# Patient Record
Sex: FEMALE | Race: WHITE | NOT HISPANIC OR LATINO | Employment: FULL TIME | RURAL
[De-identification: names, ages, dates, MRNs, and addresses within clinical notes are randomized per-mention and may not be internally consistent; named-entity substitution may affect disease eponyms.]

---

## 2021-10-29 ENCOUNTER — OFFICE VISIT (OUTPATIENT)
Dept: FAMILY MEDICINE | Facility: CLINIC | Age: 61
End: 2021-10-29
Payer: COMMERCIAL

## 2021-10-29 VITALS
SYSTOLIC BLOOD PRESSURE: 166 MMHG | HEIGHT: 61 IN | BODY MASS INDEX: 35.12 KG/M2 | HEART RATE: 82 BPM | DIASTOLIC BLOOD PRESSURE: 87 MMHG | WEIGHT: 186 LBS

## 2021-10-29 DIAGNOSIS — R21 RASH AND OTHER NONSPECIFIC SKIN ERUPTION: Primary | ICD-10-CM

## 2021-10-29 PROCEDURE — 3008F BODY MASS INDEX DOCD: CPT | Mod: ,,, | Performed by: NURSE PRACTITIONER

## 2021-10-29 PROCEDURE — 3008F PR BODY MASS INDEX (BMI) DOCUMENTED: ICD-10-PCS | Mod: ,,, | Performed by: NURSE PRACTITIONER

## 2021-10-29 PROCEDURE — 3079F PR MOST RECENT DIASTOLIC BLOOD PRESSURE 80-89 MM HG: ICD-10-PCS | Mod: ,,, | Performed by: NURSE PRACTITIONER

## 2021-10-29 PROCEDURE — 1160F PR REVIEW ALL MEDS BY PRESCRIBER/CLIN PHARMACIST DOCUMENTED: ICD-10-PCS | Mod: ,,, | Performed by: NURSE PRACTITIONER

## 2021-10-29 PROCEDURE — 3077F PR MOST RECENT SYSTOLIC BLOOD PRESSURE >= 140 MM HG: ICD-10-PCS | Mod: ,,, | Performed by: NURSE PRACTITIONER

## 2021-10-29 PROCEDURE — 99213 OFFICE O/P EST LOW 20 MIN: CPT | Mod: ,,, | Performed by: NURSE PRACTITIONER

## 2021-10-29 PROCEDURE — 1159F PR MEDICATION LIST DOCUMENTED IN MEDICAL RECORD: ICD-10-PCS | Mod: ,,, | Performed by: NURSE PRACTITIONER

## 2021-10-29 PROCEDURE — 1160F RVW MEDS BY RX/DR IN RCRD: CPT | Mod: ,,, | Performed by: NURSE PRACTITIONER

## 2021-10-29 PROCEDURE — 3077F SYST BP >= 140 MM HG: CPT | Mod: ,,, | Performed by: NURSE PRACTITIONER

## 2021-10-29 PROCEDURE — 1159F MED LIST DOCD IN RCRD: CPT | Mod: ,,, | Performed by: NURSE PRACTITIONER

## 2021-10-29 PROCEDURE — 3079F DIAST BP 80-89 MM HG: CPT | Mod: ,,, | Performed by: NURSE PRACTITIONER

## 2021-10-29 PROCEDURE — 99213 PR OFFICE/OUTPT VISIT, EST, LEVL III, 20-29 MIN: ICD-10-PCS | Mod: ,,, | Performed by: NURSE PRACTITIONER

## 2022-07-08 ENCOUNTER — OFFICE VISIT (OUTPATIENT)
Dept: FAMILY MEDICINE | Facility: CLINIC | Age: 62
End: 2022-07-08
Payer: COMMERCIAL

## 2022-07-08 VITALS — BODY MASS INDEX: 31.89 KG/M2 | WEIGHT: 180 LBS | HEIGHT: 63 IN

## 2022-07-08 DIAGNOSIS — J02.9 SORE THROAT: ICD-10-CM

## 2022-07-08 DIAGNOSIS — U07.1 COVID-19: Primary | ICD-10-CM

## 2022-07-08 DIAGNOSIS — R05.9 COUGH: ICD-10-CM

## 2022-07-08 DIAGNOSIS — R50.9 FEVER, UNSPECIFIED FEVER CAUSE: ICD-10-CM

## 2022-07-08 LAB
CTP QC/QA: YES
SARS-COV-2 AG RESP QL IA.RAPID: POSITIVE

## 2022-07-08 PROCEDURE — 1159F MED LIST DOCD IN RCRD: CPT | Mod: ,,, | Performed by: NURSE PRACTITIONER

## 2022-07-08 PROCEDURE — 87426 SARS CORONAVIRUS 2 ANTIGEN POCT: ICD-10-PCS | Mod: QW,,, | Performed by: NURSE PRACTITIONER

## 2022-07-08 PROCEDURE — 87426 SARSCOV CORONAVIRUS AG IA: CPT | Mod: QW,,, | Performed by: NURSE PRACTITIONER

## 2022-07-08 PROCEDURE — 1160F RVW MEDS BY RX/DR IN RCRD: CPT | Mod: ,,, | Performed by: NURSE PRACTITIONER

## 2022-07-08 PROCEDURE — 3008F BODY MASS INDEX DOCD: CPT | Mod: ,,, | Performed by: NURSE PRACTITIONER

## 2022-07-08 PROCEDURE — 3008F PR BODY MASS INDEX (BMI) DOCUMENTED: ICD-10-PCS | Mod: ,,, | Performed by: NURSE PRACTITIONER

## 2022-07-08 PROCEDURE — 99213 PR OFFICE/OUTPT VISIT, EST, LEVL III, 20-29 MIN: ICD-10-PCS | Mod: ,,, | Performed by: NURSE PRACTITIONER

## 2022-07-08 PROCEDURE — 99213 OFFICE O/P EST LOW 20 MIN: CPT | Mod: ,,, | Performed by: NURSE PRACTITIONER

## 2022-07-08 PROCEDURE — 1159F PR MEDICATION LIST DOCUMENTED IN MEDICAL RECORD: ICD-10-PCS | Mod: ,,, | Performed by: NURSE PRACTITIONER

## 2022-07-08 PROCEDURE — 1160F PR REVIEW ALL MEDS BY PRESCRIBER/CLIN PHARMACIST DOCUMENTED: ICD-10-PCS | Mod: ,,, | Performed by: NURSE PRACTITIONER

## 2022-07-08 RX ORDER — METHYLPREDNISOLONE 4 MG/1
TABLET ORAL
Qty: 21 EACH | Refills: 0 | Status: SHIPPED | OUTPATIENT
Start: 2022-07-08 | End: 2022-07-29

## 2022-07-08 RX ORDER — AZITHROMYCIN 500 MG/1
500 TABLET, FILM COATED ORAL DAILY
Qty: 7 TABLET | Refills: 0 | Status: SHIPPED | OUTPATIENT
Start: 2022-07-08 | End: 2023-03-28

## 2022-07-08 NOTE — LETTER
July 8, 2022    Tavia Jordan  [address] [place] [address]  [place] [address]             [facility]  Family Medicine  [address] [facility]  [place] [address]  Phone: [phone]  Fax: [phone]   July 8, 2022     Patient: Tavia Jordan   YOB: 1960   Date of Visit: 7/8/2022       To Whom it May Concern:    Tavia Jordan was seen in my clinic on 7/8/2022. She may return to work on 07/13/2022.    Please excuse her from any classes or work missed.    If you have any questions or concerns, please don't hesitate to call.    Sincerely,         EDUARD LeivaP

## 2022-07-08 NOTE — PROGRESS NOTES
Subjective:       Patient ID: Tavia Jordan is a 62 y.o. female.    Chief Complaint: Cough, Fever, Sore Throat, Sinus Problem, and Other (Pt c/o symptoms x 1 day, pt states she took a covid test at home yesterday and it was positive.)    HPI   Patient presents for COVID-19 testing    Says that she took home COVID-19 test yesterday and result was positive    Symptoms began 1 day ago    Review of Systems   Constitutional: Positive for fatigue and fever.   HENT: Positive for nasal congestion and sore throat.    Respiratory: Positive for cough. Negative for shortness of breath and wheezing.    Cardiovascular: Negative for chest pain.   Gastrointestinal: Negative for abdominal pain.   Neurological: Positive for headaches.         Objective:      Physical Exam  Vitals and nursing note reviewed.   Constitutional:       General: She is not in acute distress.     Appearance: Normal appearance. She is ill-appearing.   HENT:      Head: Normocephalic.      Nose: Congestion present.      Mouth/Throat:      Mouth: Mucous membranes are moist.   Eyes:      Conjunctiva/sclera: Conjunctivae normal.   Neck:      Thyroid: No thyromegaly.      Trachea: Trachea normal.   Cardiovascular:      Rate and Rhythm: Normal rate and regular rhythm.      Pulses: Normal pulses.      Heart sounds: Normal heart sounds.   Pulmonary:      Effort: Pulmonary effort is normal. No respiratory distress.      Breath sounds: Normal breath sounds.   Musculoskeletal:      Cervical back: Neck supple.   Lymphadenopathy:      Cervical: No cervical adenopathy.   Skin:     General: Skin is warm and dry.   Neurological:      General: No focal deficit present.      Mental Status: She is alert and oriented to person, place, and time.   Psychiatric:         Mood and Affect: Mood normal.         Behavior: Behavior normal.         Assessment:       1. COVID-19    2. Fever, unspecified fever cause    3. Cough    4. Sore throat        Plan:       Patient  Instructions   COVID-19 POSITIVE    Instructed to quarantine x 5 days then may return to work at which point she needs to wear a mask for another 5 days    Medications as prescribed along with supportive care measures    Follow up if any worsening s/s develop    Patient voiced understanding of all    COVID-19  -     azithromycin (ZITHROMAX) 500 MG tablet; Take 1 tablet (500 mg total) by mouth once daily.  Dispense: 7 tablet; Refill: 0  -     methylPREDNISolone (MEDROL DOSEPACK) 4 mg tablet; use as directed  Dispense: 21 each; Refill: 0    Fever, unspecified fever cause  -     SARS Coronavirus 2 Antigen, POCT    Cough  -     SARS Coronavirus 2 Antigen, POCT    Sore throat  -     SARS Coronavirus 2 Antigen, POCT       I, Dr. Red Márquez M.D., have reviewed the encounter note for this visit and agree with the assessment and plan as put forth by the Nurse Practitioner.

## 2022-07-08 NOTE — PATIENT INSTRUCTIONS
COVID-19 POSITIVE    Instructed to quarantine x 5 days then may return to work at which point she needs to wear a mask for another 5 days    Medications as prescribed along with supportive care measures    Follow up if any worsening s/s develop    Patient voiced understanding of all

## 2022-07-11 ENCOUNTER — TELEPHONE (OUTPATIENT)
Dept: FAMILY MEDICINE | Facility: CLINIC | Age: 62
End: 2022-07-11
Payer: COMMERCIAL

## 2022-07-11 DIAGNOSIS — R05.9 COUGH: Primary | ICD-10-CM

## 2022-07-11 RX ORDER — BENZONATATE 100 MG/1
100 CAPSULE ORAL 3 TIMES DAILY PRN
Qty: 30 CAPSULE | Refills: 0 | Status: SHIPPED | OUTPATIENT
Start: 2022-07-11 | End: 2022-07-21

## 2022-07-11 NOTE — TELEPHONE ENCOUNTER
"Patient called to say that she is much better overall since Friday but is having intermittent "coughing spells" and is now hoarse. Wants to know if we can add anything to help.     Will send in Tessalon Perles for PRN use.   Follow up if symptoms persist/worsen  "

## 2023-03-28 ENCOUNTER — OFFICE VISIT (OUTPATIENT)
Dept: FAMILY MEDICINE | Facility: CLINIC | Age: 63
End: 2023-03-28
Payer: COMMERCIAL

## 2023-03-28 VITALS
TEMPERATURE: 98 F | HEART RATE: 82 BPM | BODY MASS INDEX: 33.31 KG/M2 | WEIGHT: 188 LBS | HEIGHT: 63 IN | DIASTOLIC BLOOD PRESSURE: 85 MMHG | OXYGEN SATURATION: 97 % | SYSTOLIC BLOOD PRESSURE: 169 MMHG

## 2023-03-28 DIAGNOSIS — R03.0 ELEVATED BLOOD PRESSURE READING WITHOUT DIAGNOSIS OF HYPERTENSION: ICD-10-CM

## 2023-03-28 DIAGNOSIS — L30.9 DERMATITIS: Primary | ICD-10-CM

## 2023-03-28 PROCEDURE — 1159F PR MEDICATION LIST DOCUMENTED IN MEDICAL RECORD: ICD-10-PCS | Mod: ,,, | Performed by: NURSE PRACTITIONER

## 2023-03-28 PROCEDURE — 1160F PR REVIEW ALL MEDS BY PRESCRIBER/CLIN PHARMACIST DOCUMENTED: ICD-10-PCS | Mod: ,,, | Performed by: NURSE PRACTITIONER

## 2023-03-28 PROCEDURE — 1101F PT FALLS ASSESS-DOCD LE1/YR: CPT | Mod: ,,, | Performed by: NURSE PRACTITIONER

## 2023-03-28 PROCEDURE — 99213 OFFICE O/P EST LOW 20 MIN: CPT | Mod: ,,, | Performed by: NURSE PRACTITIONER

## 2023-03-28 PROCEDURE — 3288F PR FALLS RISK ASSESSMENT DOCUMENTED: ICD-10-PCS | Mod: ,,, | Performed by: NURSE PRACTITIONER

## 2023-03-28 PROCEDURE — 3077F SYST BP >= 140 MM HG: CPT | Mod: ,,, | Performed by: NURSE PRACTITIONER

## 2023-03-28 PROCEDURE — 3288F FALL RISK ASSESSMENT DOCD: CPT | Mod: ,,, | Performed by: NURSE PRACTITIONER

## 2023-03-28 PROCEDURE — 99213 PR OFFICE/OUTPT VISIT, EST, LEVL III, 20-29 MIN: ICD-10-PCS | Mod: ,,, | Performed by: NURSE PRACTITIONER

## 2023-03-28 PROCEDURE — 3079F PR MOST RECENT DIASTOLIC BLOOD PRESSURE 80-89 MM HG: ICD-10-PCS | Mod: ,,, | Performed by: NURSE PRACTITIONER

## 2023-03-28 PROCEDURE — 1101F PR PT FALLS ASSESS DOC 0-1 FALLS W/OUT INJ PAST YR: ICD-10-PCS | Mod: ,,, | Performed by: NURSE PRACTITIONER

## 2023-03-28 PROCEDURE — 1159F MED LIST DOCD IN RCRD: CPT | Mod: ,,, | Performed by: NURSE PRACTITIONER

## 2023-03-28 PROCEDURE — 3079F DIAST BP 80-89 MM HG: CPT | Mod: ,,, | Performed by: NURSE PRACTITIONER

## 2023-03-28 PROCEDURE — 3008F BODY MASS INDEX DOCD: CPT | Mod: ,,, | Performed by: NURSE PRACTITIONER

## 2023-03-28 PROCEDURE — 1160F RVW MEDS BY RX/DR IN RCRD: CPT | Mod: ,,, | Performed by: NURSE PRACTITIONER

## 2023-03-28 PROCEDURE — 3077F PR MOST RECENT SYSTOLIC BLOOD PRESSURE >= 140 MM HG: ICD-10-PCS | Mod: ,,, | Performed by: NURSE PRACTITIONER

## 2023-03-28 PROCEDURE — 3008F PR BODY MASS INDEX (BMI) DOCUMENTED: ICD-10-PCS | Mod: ,,, | Performed by: NURSE PRACTITIONER

## 2023-03-28 RX ORDER — HYDROXYZINE HYDROCHLORIDE 10 MG/1
10 TABLET, FILM COATED ORAL EVERY 8 HOURS PRN
Qty: 30 TABLET | Refills: 0 | Status: SHIPPED | OUTPATIENT
Start: 2023-03-28 | End: 2023-05-18

## 2023-03-28 RX ORDER — TRIAMCINOLONE ACETONIDE 1 MG/G
OINTMENT TOPICAL 2 TIMES DAILY
Qty: 30 G | Refills: 1 | Status: SHIPPED | OUTPATIENT
Start: 2023-03-28 | End: 2023-06-08 | Stop reason: SDUPTHER

## 2023-03-28 RX ORDER — METHYLPREDNISOLONE 4 MG/1
TABLET ORAL
Qty: 21 EACH | Refills: 0 | Status: SHIPPED | OUTPATIENT
Start: 2023-03-28 | End: 2023-04-18

## 2023-03-28 NOTE — PROGRESS NOTES
Clinic note     Patient name: Tavia Jordan is a 63 y.o. female   Chief compliant   Chief Complaint   Patient presents with    Rash     Rash on arms has used OTC cortisone and calamine lotion. Now has it on both shins and lower back.  Changed laundry soap but has changed back. Nothing else new.        Subjective     History of present illness   PCP: none   In clinic for evaluation of pruritic skin rash x three weeks, rash started on BUE after wearing a sweater washed in a new laundry detergent, since then rash has seemed to spread to lower bilateral legs and now to lower back   Denies any fever   Denies any known contact with any other new foods, substances  Denies any use of OTC medication other than benadryl for itching,  no use of OTC herbal or vitamin products   Denies any recent travel or hotel room stays   She has been using cortisone cream and calamine lotion with no improvement   Blood pressure is elevated in clinic today, she is currently not on any medication for HTN   She states she has taken something in distant past written by Dr Mccoy   She denies any chest pain, shortness of breath, dizziness, palpitations or headache           Social History     Tobacco Use    Smoking status: Never     Passive exposure: Never    Smokeless tobacco: Never   Substance Use Topics    Alcohol use: Never    Drug use: Never       Review of patient's allergies indicates:  No Known Allergies    History reviewed. No pertinent past medical history.    Past Surgical History:   Procedure Laterality Date    HYSTERECTOMY  1980        History reviewed. No pertinent family history.      Current Outpatient Medications:     vitamins A,C,E-zinc-copper 4,296 mcg-226 mg-90 mg Cap, Take by mouth., Disp: , Rfl:     hydrOXYzine HCL (ATARAX) 10 MG Tab, Take 1 tablet (10 mg total) by mouth every 8 (eight) hours as needed (itching)., Disp: 30 tablet, Rfl: 0    methylPREDNISolone (MEDROL DOSEPACK) 4 mg tablet, use as directed, Disp: 21  "each, Rfl: 0    triamcinolone acetonide 0.1% (KENALOG) 0.1 % ointment, Apply topically 2 (two) times daily. Apply to affected areas as needed, Disp: 30 g, Rfl: 1    Review of Systems   Constitutional:  Negative for appetite change, chills, fatigue, fever and unexpected weight change.   Respiratory:  Negative for cough and shortness of breath.    Cardiovascular:  Negative for chest pain, palpitations and leg swelling.   Gastrointestinal:  Negative for abdominal pain, change in bowel habit, constipation, diarrhea, nausea, vomiting and change in bowel habit.   Genitourinary:  Negative for dysuria and frequency.   Musculoskeletal:  Negative for arthralgias, gait problem and myalgias.   Integumentary:  Positive for rash.   Neurological:  Negative for dizziness, syncope, light-headedness and headaches.   Psychiatric/Behavioral:  Negative for dysphoric mood and sleep disturbance. The patient is not nervous/anxious.      Objective     BP (!) 169/85   Pulse 82   Temp 97.8 °F (36.6 °C)   Ht 5' 3" (1.6 m)   Wt 85.3 kg (188 lb)   SpO2 97%   BMI 33.30 kg/m²     Physical Exam   Constitutional: She is oriented to person, place, and time. No distress.   HENT:   Head: Atraumatic.   Mouth/Throat: Mucous membranes are moist.   Cardiovascular: Normal rate and regular rhythm. Pulmonary:      Effort: Pulmonary effort is normal. No respiratory distress.      Breath sounds: Normal breath sounds. No wheezing, rhonchi or rales.     Abdominal: Soft. Bowel sounds are normal. She exhibits no distension. There is no abdominal tenderness.   Musculoskeletal:         General: Normal range of motion.      Cervical back: Neck supple.      Right lower leg: No edema.      Left lower leg: No edema.   Neurological: She is alert and oriented to person, place, and time. Gait normal.   Skin: Skin is warm and dry. Rash noted. Rash is macular and papular.        Psychiatric: Her behavior is normal. Mood normal.     No results found for: WBC, HGB, HCT, " MCV, PLT    CMP  No results found for: NA, K, CL, CO2, GLU, BUN, CREATININE, CALCIUM, PROT, ALBUMIN, BILITOT, ALKPHOS, AST, ALT, ANIONGAP, ESTGFRAFRICA, EGFRNONAA  No results found for: TSH  No results found for: CHOL  No results found for: HDL  No results found for: LDLCALC  No results found for: TRIG  No results found for: CHOLHDL  No results found for: LABA1C, HGBA1C      Assessment and Plan   Dermatitis  -     triamcinolone acetonide 0.1% (KENALOG) 0.1 % ointment; Apply topically 2 (two) times daily. Apply to affected areas as needed  Dispense: 30 g; Refill: 1  -     hydrOXYzine HCL (ATARAX) 10 MG Tab; Take 1 tablet (10 mg total) by mouth every 8 (eight) hours as needed (itching).  Dispense: 30 tablet; Refill: 0  -     methylPREDNISolone (MEDROL DOSEPACK) 4 mg tablet; use as directed  Dispense: 21 each; Refill: 0    Elevated blood pressure reading without diagnosis of hypertension    BMI 33.0-33.9,adult          Patient Instructions  Patient Instructions   Dye free perfume free bath and laundry soap   Warm bath/showers, avoid use of hot water     Monitor blood pressure closely, check  in the am and again in the afternoon, keep written log if readings are consistently higher than 140/90 scheduled follow up appointment for evaluation     Decreased salt and caffeine will help lower blood pressure     Check blood pressure in the am and again in the afternoon, keep written log      Medrol, triamcinolone ointment and atarax as prescribed   Follow up in 5-7 days if no improvement in symptoms

## 2023-03-28 NOTE — PATIENT INSTRUCTIONS
Dye free perfume free bath and laundry soap   Warm bath/showers, avoid use of hot water     Monitor blood pressure closely, check  in the am and again in the afternoon, keep written log if readings are consistently higher than 140/90 scheduled follow up appointment for evaluation     Decreased salt and caffeine will help lower blood pressure     Check blood pressure in the am and again in the afternoon, keep written log      Medrol, triamcinolone ointment and atarax as prescribed   Follow up in 5-7 days if no improvement in symptoms

## 2023-05-18 ENCOUNTER — OFFICE VISIT (OUTPATIENT)
Dept: FAMILY MEDICINE | Facility: CLINIC | Age: 63
End: 2023-05-18
Payer: COMMERCIAL

## 2023-05-18 VITALS
DIASTOLIC BLOOD PRESSURE: 90 MMHG | SYSTOLIC BLOOD PRESSURE: 157 MMHG | WEIGHT: 190 LBS | BODY MASS INDEX: 33.66 KG/M2 | HEIGHT: 63 IN

## 2023-05-18 DIAGNOSIS — R21 SKIN RASH: ICD-10-CM

## 2023-05-18 DIAGNOSIS — I10 ESSENTIAL HYPERTENSION: Primary | ICD-10-CM

## 2023-05-18 DIAGNOSIS — Z13.220 SCREENING FOR HYPERLIPIDEMIA: ICD-10-CM

## 2023-05-18 DIAGNOSIS — Z13.1 SCREENING FOR DIABETES MELLITUS: ICD-10-CM

## 2023-05-18 DIAGNOSIS — Z11.59 ENCOUNTER FOR HEPATITIS C SCREENING TEST FOR LOW RISK PATIENT: ICD-10-CM

## 2023-05-18 DIAGNOSIS — Z13.29 SCREENING FOR HYPOTHYROIDISM: ICD-10-CM

## 2023-05-18 LAB
BASOPHILS # BLD AUTO: 0.1 K/UL (ref 0–0.2)
BASOPHILS NFR BLD AUTO: 1 % (ref 0–1)
DIFFERENTIAL METHOD BLD: ABNORMAL
EOSINOPHIL # BLD AUTO: 0.46 K/UL (ref 0–0.5)
EOSINOPHIL NFR BLD AUTO: 4.6 % (ref 1–4)
ERYTHROCYTE [DISTWIDTH] IN BLOOD BY AUTOMATED COUNT: 13.1 % (ref 11.5–14.5)
EST. AVERAGE GLUCOSE BLD GHB EST-MCNC: 104 MG/DL
HBA1C MFR BLD HPLC: 5.7 % (ref 4.5–6.6)
HCT VFR BLD AUTO: 40.8 % (ref 38–47)
HGB BLD-MCNC: 12.9 G/DL (ref 12–16)
IMM GRANULOCYTES # BLD AUTO: 0.04 K/UL (ref 0–0.04)
IMM GRANULOCYTES NFR BLD: 0.4 % (ref 0–0.4)
LYMPHOCYTES # BLD AUTO: 3.38 K/UL (ref 1–4.8)
LYMPHOCYTES NFR BLD AUTO: 33.7 % (ref 27–41)
MCH RBC QN AUTO: 28.4 PG (ref 27–31)
MCHC RBC AUTO-ENTMCNC: 31.6 G/DL (ref 32–36)
MCV RBC AUTO: 89.9 FL (ref 80–96)
MONOCYTES # BLD AUTO: 0.78 K/UL (ref 0–0.8)
MONOCYTES NFR BLD AUTO: 7.8 % (ref 2–6)
MPC BLD CALC-MCNC: 10.1 FL (ref 9.4–12.4)
NEUTROPHILS # BLD AUTO: 5.27 K/UL (ref 1.8–7.7)
NEUTROPHILS NFR BLD AUTO: 52.5 % (ref 53–65)
NRBC # BLD AUTO: 0 X10E3/UL
NRBC, AUTO (.00): 0 %
PLATELET # BLD AUTO: 309 K/UL (ref 150–400)
RBC # BLD AUTO: 4.54 M/UL (ref 4.2–5.4)
WBC # BLD AUTO: 10.03 K/UL (ref 4.5–11)

## 2023-05-18 PROCEDURE — 99214 PR OFFICE/OUTPT VISIT, EST, LEVL IV, 30-39 MIN: ICD-10-PCS | Mod: ,,, | Performed by: NURSE PRACTITIONER

## 2023-05-18 PROCEDURE — 3288F FALL RISK ASSESSMENT DOCD: CPT | Mod: ,,, | Performed by: NURSE PRACTITIONER

## 2023-05-18 PROCEDURE — 3008F PR BODY MASS INDEX (BMI) DOCUMENTED: ICD-10-PCS | Mod: ,,, | Performed by: NURSE PRACTITIONER

## 2023-05-18 PROCEDURE — 1101F PT FALLS ASSESS-DOCD LE1/YR: CPT | Mod: ,,, | Performed by: NURSE PRACTITIONER

## 2023-05-18 PROCEDURE — 3288F PR FALLS RISK ASSESSMENT DOCUMENTED: ICD-10-PCS | Mod: ,,, | Performed by: NURSE PRACTITIONER

## 2023-05-18 PROCEDURE — 86803 HEPATITIS C ANTIBODY: ICD-10-PCS | Mod: ,,, | Performed by: CLINICAL MEDICAL LABORATORY

## 2023-05-18 PROCEDURE — 80050 GENERAL HEALTH PANEL: CPT | Mod: ,,, | Performed by: CLINICAL MEDICAL LABORATORY

## 2023-05-18 PROCEDURE — 3077F PR MOST RECENT SYSTOLIC BLOOD PRESSURE >= 140 MM HG: ICD-10-PCS | Mod: ,,, | Performed by: NURSE PRACTITIONER

## 2023-05-18 PROCEDURE — 4010F PR ACE/ARB THEARPY RXD/TAKEN: ICD-10-PCS | Mod: ,,, | Performed by: NURSE PRACTITIONER

## 2023-05-18 PROCEDURE — 3008F BODY MASS INDEX DOCD: CPT | Mod: ,,, | Performed by: NURSE PRACTITIONER

## 2023-05-18 PROCEDURE — 99214 OFFICE O/P EST MOD 30 MIN: CPT | Mod: ,,, | Performed by: NURSE PRACTITIONER

## 2023-05-18 PROCEDURE — 80050 PR  GENERAL HEALTH PANEL: ICD-10-PCS | Mod: ,,, | Performed by: CLINICAL MEDICAL LABORATORY

## 2023-05-18 PROCEDURE — 3044F HG A1C LEVEL LT 7.0%: CPT | Mod: ,,, | Performed by: NURSE PRACTITIONER

## 2023-05-18 PROCEDURE — 4010F ACE/ARB THERAPY RXD/TAKEN: CPT | Mod: ,,, | Performed by: NURSE PRACTITIONER

## 2023-05-18 PROCEDURE — 3080F DIAST BP >= 90 MM HG: CPT | Mod: ,,, | Performed by: NURSE PRACTITIONER

## 2023-05-18 PROCEDURE — 1101F PR PT FALLS ASSESS DOC 0-1 FALLS W/OUT INJ PAST YR: ICD-10-PCS | Mod: ,,, | Performed by: NURSE PRACTITIONER

## 2023-05-18 PROCEDURE — 3080F PR MOST RECENT DIASTOLIC BLOOD PRESSURE >= 90 MM HG: ICD-10-PCS | Mod: ,,, | Performed by: NURSE PRACTITIONER

## 2023-05-18 PROCEDURE — 83036 HEMOGLOBIN A1C: ICD-10-PCS | Mod: ,,, | Performed by: CLINICAL MEDICAL LABORATORY

## 2023-05-18 PROCEDURE — 80061 LIPID PANEL: CPT | Mod: ,,, | Performed by: CLINICAL MEDICAL LABORATORY

## 2023-05-18 PROCEDURE — 80061 LIPID PANEL: ICD-10-PCS | Mod: ,,, | Performed by: CLINICAL MEDICAL LABORATORY

## 2023-05-18 PROCEDURE — 3077F SYST BP >= 140 MM HG: CPT | Mod: ,,, | Performed by: NURSE PRACTITIONER

## 2023-05-18 PROCEDURE — 3044F PR MOST RECENT HEMOGLOBIN A1C LEVEL <7.0%: ICD-10-PCS | Mod: ,,, | Performed by: NURSE PRACTITIONER

## 2023-05-18 PROCEDURE — 86803 HEPATITIS C AB TEST: CPT | Mod: ,,, | Performed by: CLINICAL MEDICAL LABORATORY

## 2023-05-18 PROCEDURE — 83036 HEMOGLOBIN GLYCOSYLATED A1C: CPT | Mod: ,,, | Performed by: CLINICAL MEDICAL LABORATORY

## 2023-05-18 RX ORDER — LISINOPRIL 10 MG/1
10 TABLET ORAL DAILY
Qty: 30 TABLET | Refills: 2 | Status: SHIPPED | OUTPATIENT
Start: 2023-05-18 | End: 2023-08-07 | Stop reason: SDUPTHER

## 2023-05-18 NOTE — PATIENT INSTRUCTIONS
Referral to dermatology     Start lisinopril 10 mg daily  Check blood pressure in the am and again in the afternoon, keep written log and bring in to follow up visit in two weeks     Advised to monitor BP at home and keep log, bring log in to all clinic visits .  Advised on optimal BP readings - SBP < 130 & DBP < 80.   Recommended DASH diet, stay well hydrated with water daily, eliminate or decrease caffeinated and high calorie drinks, increase physical activity, and lose weight if BMI > 25.0.

## 2023-05-19 LAB
ALBUMIN SERPL BCP-MCNC: 3.9 G/DL (ref 3.5–5)
ALBUMIN/GLOB SERPL: 1 {RATIO}
ALP SERPL-CCNC: 140 U/L (ref 55–142)
ALT SERPL W P-5'-P-CCNC: 31 U/L (ref 13–56)
ANION GAP SERPL CALCULATED.3IONS-SCNC: 9 MMOL/L (ref 7–16)
AST SERPL W P-5'-P-CCNC: 33 U/L (ref 15–37)
BILIRUB SERPL-MCNC: 0.4 MG/DL (ref ?–1.2)
BUN SERPL-MCNC: 12 MG/DL (ref 7–18)
BUN/CREAT SERPL: 16 (ref 6–20)
CALCIUM SERPL-MCNC: 9.3 MG/DL (ref 8.5–10.1)
CHLORIDE SERPL-SCNC: 107 MMOL/L (ref 98–107)
CHOLEST SERPL-MCNC: 272 MG/DL (ref 0–200)
CHOLEST/HDLC SERPL: 6.3 {RATIO}
CO2 SERPL-SCNC: 24 MMOL/L (ref 21–32)
CREAT SERPL-MCNC: 0.75 MG/DL (ref 0.55–1.02)
EGFR (NO RACE VARIABLE) (RUSH/TITUS): 88 ML/MIN/1.73M2
GLOBULIN SER-MCNC: 4.1 G/DL (ref 2–4)
GLUCOSE SERPL-MCNC: 85 MG/DL (ref 74–106)
HCV AB SER QL: NORMAL
HDLC SERPL-MCNC: 43 MG/DL (ref 40–60)
LDLC SERPL CALC-MCNC: 186 MG/DL
LDLC/HDLC SERPL: 4.3 {RATIO}
NONHDLC SERPL-MCNC: 229 MG/DL
POTASSIUM SERPL-SCNC: 4.6 MMOL/L (ref 3.5–5.1)
PROT SERPL-MCNC: 8 G/DL (ref 6.4–8.2)
SODIUM SERPL-SCNC: 135 MMOL/L (ref 136–145)
TRIGL SERPL-MCNC: 217 MG/DL (ref 35–150)
TSH SERPL DL<=0.005 MIU/L-ACNC: 1.17 UIU/ML (ref 0.36–3.74)
VLDLC SERPL-MCNC: 43 MG/DL

## 2023-06-06 ENCOUNTER — OFFICE VISIT (OUTPATIENT)
Dept: FAMILY MEDICINE | Facility: CLINIC | Age: 63
End: 2023-06-06
Payer: COMMERCIAL

## 2023-06-06 VITALS
HEIGHT: 63 IN | SYSTOLIC BLOOD PRESSURE: 144 MMHG | HEART RATE: 72 BPM | BODY MASS INDEX: 33.43 KG/M2 | WEIGHT: 188.69 LBS | DIASTOLIC BLOOD PRESSURE: 85 MMHG | OXYGEN SATURATION: 100 %

## 2023-06-06 DIAGNOSIS — E78.5 HYPERLIPIDEMIA, UNSPECIFIED HYPERLIPIDEMIA TYPE: ICD-10-CM

## 2023-06-06 DIAGNOSIS — I10 ESSENTIAL HYPERTENSION: Primary | ICD-10-CM

## 2023-06-06 PROCEDURE — 3008F BODY MASS INDEX DOCD: CPT | Mod: ,,, | Performed by: NURSE PRACTITIONER

## 2023-06-06 PROCEDURE — 1160F RVW MEDS BY RX/DR IN RCRD: CPT | Mod: ,,, | Performed by: NURSE PRACTITIONER

## 2023-06-06 PROCEDURE — 1159F MED LIST DOCD IN RCRD: CPT | Mod: ,,, | Performed by: NURSE PRACTITIONER

## 2023-06-06 PROCEDURE — 1159F PR MEDICATION LIST DOCUMENTED IN MEDICAL RECORD: ICD-10-PCS | Mod: ,,, | Performed by: NURSE PRACTITIONER

## 2023-06-06 PROCEDURE — 3044F HG A1C LEVEL LT 7.0%: CPT | Mod: ,,, | Performed by: NURSE PRACTITIONER

## 2023-06-06 PROCEDURE — 99213 OFFICE O/P EST LOW 20 MIN: CPT | Mod: ,,, | Performed by: NURSE PRACTITIONER

## 2023-06-06 PROCEDURE — 3008F PR BODY MASS INDEX (BMI) DOCUMENTED: ICD-10-PCS | Mod: ,,, | Performed by: NURSE PRACTITIONER

## 2023-06-06 PROCEDURE — 3079F DIAST BP 80-89 MM HG: CPT | Mod: ,,, | Performed by: NURSE PRACTITIONER

## 2023-06-06 PROCEDURE — 99213 PR OFFICE/OUTPT VISIT, EST, LEVL III, 20-29 MIN: ICD-10-PCS | Mod: ,,, | Performed by: NURSE PRACTITIONER

## 2023-06-06 PROCEDURE — 3044F PR MOST RECENT HEMOGLOBIN A1C LEVEL <7.0%: ICD-10-PCS | Mod: ,,, | Performed by: NURSE PRACTITIONER

## 2023-06-06 PROCEDURE — 4010F PR ACE/ARB THEARPY RXD/TAKEN: ICD-10-PCS | Mod: ,,, | Performed by: NURSE PRACTITIONER

## 2023-06-06 PROCEDURE — 3077F SYST BP >= 140 MM HG: CPT | Mod: ,,, | Performed by: NURSE PRACTITIONER

## 2023-06-06 PROCEDURE — 1160F PR REVIEW ALL MEDS BY PRESCRIBER/CLIN PHARMACIST DOCUMENTED: ICD-10-PCS | Mod: ,,, | Performed by: NURSE PRACTITIONER

## 2023-06-06 PROCEDURE — 4010F ACE/ARB THERAPY RXD/TAKEN: CPT | Mod: ,,, | Performed by: NURSE PRACTITIONER

## 2023-06-06 PROCEDURE — 3079F PR MOST RECENT DIASTOLIC BLOOD PRESSURE 80-89 MM HG: ICD-10-PCS | Mod: ,,, | Performed by: NURSE PRACTITIONER

## 2023-06-06 PROCEDURE — 3077F PR MOST RECENT SYSTOLIC BLOOD PRESSURE >= 140 MM HG: ICD-10-PCS | Mod: ,,, | Performed by: NURSE PRACTITIONER

## 2023-06-06 RX ORDER — ATORVASTATIN CALCIUM 10 MG/1
10 TABLET, FILM COATED ORAL DAILY
Qty: 90 TABLET | Refills: 0 | Status: SHIPPED | OUTPATIENT
Start: 2023-06-06 | End: 2023-09-06 | Stop reason: SDUPTHER

## 2023-06-06 NOTE — PROGRESS NOTES
"Clinic note     Patient name: Tavia Jordan is a 63 y.o. female   Chief compliant   Chief Complaint   Patient presents with    Hypertension     B/p follow up. PT states she is feeling better, only one headache. Has BP log with her.  States she has been trying to lose weight and that she is a little discouraged she has only lost two pounds.        Subjective     History of present illness   In clinic for follow up on HTN after starting lisinopril 10 mg on 5/18/23  She is tolerating medication without problems   She states she "is feeling so much better" since starting medications  Blood pressure log reviewed and will be scanned into record; over the last three days readings have ranged 120/81 to 130/79  Lab obtained on 5/18/23, results reviewed, all questions addressed   Total cholesterol 272, trig 217, HDL 43 and   Discussed following low saturated fat low cholesterol diet, increasing physical exercise, and continuing with weight loss goals   Will also trial Lipitor 10 mg at hs, dosing and possible side effects discussed, understanding verbalized     Mammogram: she declines at this time, wants to get past dermatology referral before scheduling   DXA: she declines at this time, wants to get past dermatology referral before scheduling   Colonoscopy: she declines at this time, wants to get past dermatology referral before scheduling       Hypertension  Pertinent negatives include no chest pain, headaches, palpitations or shortness of breath.     Social History     Tobacco Use    Smoking status: Never     Passive exposure: Never    Smokeless tobacco: Never   Substance Use Topics    Alcohol use: Never    Drug use: Never       Review of patient's allergies indicates:  No Known Allergies    History reviewed. No pertinent past medical history.    Past Surgical History:   Procedure Laterality Date    HYSTERECTOMY  1980        History reviewed. No pertinent family history.      Current Outpatient Medications: " "    atorvastatin (LIPITOR) 10 MG tablet, Take 1 tablet (10 mg total) by mouth once daily., Disp: 90 tablet, Rfl: 0    lisinopriL 10 MG tablet, Take 1 tablet (10 mg total) by mouth once daily., Disp: 30 tablet, Rfl: 2    triamcinolone acetonide 0.1% (KENALOG) 0.1 % ointment, Apply topically 2 (two) times daily. Apply to affected areas as needed, Disp: 30 g, Rfl: 1    vitamins A,C,E-zinc-copper 4,296 mcg-226 mg-90 mg Cap, Take by mouth., Disp: , Rfl:     Review of Systems   Constitutional:  Negative for appetite change, chills, fatigue, fever and unexpected weight change.   Respiratory:  Negative for cough and shortness of breath.    Cardiovascular:  Negative for chest pain, palpitations and leg swelling.   Gastrointestinal:  Negative for abdominal pain, change in bowel habit, constipation, diarrhea, nausea, vomiting and change in bowel habit.   Genitourinary:  Negative for dysuria and frequency.   Musculoskeletal:  Negative for arthralgias, gait problem and myalgias.   Neurological:  Negative for dizziness, syncope, light-headedness and headaches.   Psychiatric/Behavioral:  Negative for dysphoric mood and sleep disturbance. The patient is not nervous/anxious.      Objective     BP (!) 144/85   Pulse 72   Ht 5' 3" (1.6 m)   Wt 85.6 kg (188 lb 11.2 oz)   SpO2 100%   BMI 33.43 kg/m²     Physical Exam   Constitutional: She is oriented to person, place, and time. No distress.   HENT:   Head: Atraumatic.   Mouth/Throat: Mucous membranes are moist.   Cardiovascular: Normal rate and regular rhythm. Pulmonary:      Effort: Pulmonary effort is normal. No respiratory distress.      Breath sounds: Normal breath sounds. No wheezing, rhonchi or rales.     Abdominal: Soft. Bowel sounds are normal. She exhibits no distension. There is no abdominal tenderness.   Musculoskeletal:         General: Normal range of motion.      Cervical back: Neck supple.      Right lower leg: No edema.      Left lower leg: No edema.   Neurological: " She is alert and oriented to person, place, and time. Gait normal.   Skin: Skin is warm and dry.   Psychiatric: Her behavior is normal. Mood normal.     Lab Results   Component Value Date    WBC 10.03 05/18/2023    HGB 12.9 05/18/2023    HCT 40.8 05/18/2023    MCV 89.9 05/18/2023     05/18/2023       CMP  Sodium   Date Value Ref Range Status   05/18/2023 135 (L) 136 - 145 mmol/L Final     Potassium   Date Value Ref Range Status   05/18/2023 4.6 3.5 - 5.1 mmol/L Final     Chloride   Date Value Ref Range Status   05/18/2023 107 98 - 107 mmol/L Final     CO2   Date Value Ref Range Status   05/18/2023 24 21 - 32 mmol/L Final     Glucose   Date Value Ref Range Status   05/18/2023 85 74 - 106 mg/dL Final     BUN   Date Value Ref Range Status   05/18/2023 12 7 - 18 mg/dL Final     Creatinine   Date Value Ref Range Status   05/18/2023 0.75 0.55 - 1.02 mg/dL Final     Calcium   Date Value Ref Range Status   05/18/2023 9.3 8.5 - 10.1 mg/dL Final     Total Protein   Date Value Ref Range Status   05/18/2023 8.0 6.4 - 8.2 g/dL Final     Albumin   Date Value Ref Range Status   05/18/2023 3.9 3.5 - 5.0 g/dL Final     Bilirubin, Total   Date Value Ref Range Status   05/18/2023 0.4 >0.0 - 1.2 mg/dL Final     Alk Phos   Date Value Ref Range Status   05/18/2023 140 55 - 142 U/L Final     AST   Date Value Ref Range Status   05/18/2023 33 15 - 37 U/L Final     ALT   Date Value Ref Range Status   05/18/2023 31 13 - 56 U/L Final     Anion Gap   Date Value Ref Range Status   05/18/2023 9 7 - 16 mmol/L Final     Lab Results   Component Value Date    TSH 1.170 05/18/2023     Lab Results   Component Value Date    CHOL 272 (H) 05/18/2023     Lab Results   Component Value Date    HDL 43 05/18/2023     Lab Results   Component Value Date    LDLCALC 186 05/18/2023     Lab Results   Component Value Date    TRIG 217 (H) 05/18/2023     Lab Results   Component Value Date    CHOLHDL 6.3 05/18/2023     Lab Results   Component Value Date    HGBA1C  5.7 05/18/2023         Assessment and Plan   Essential hypertension    Hyperlipidemia, unspecified hyperlipidemia type  -     atorvastatin (LIPITOR) 10 MG tablet; Take 1 tablet (10 mg total) by mouth once daily.  Dispense: 90 tablet; Refill: 0    BMI 33.0-33.9,adult          Patient Instructions  Patient Instructions   Continue lisinopril   Start Lipitor   Follow up in three months, repeat lipid and liver enzymes at follow up visit

## 2023-06-06 NOTE — PATIENT INSTRUCTIONS
Continue lisinopril   Start Lipitor   Follow up in three months, repeat lipid and liver enzymes at follow up visit

## 2023-06-08 DIAGNOSIS — L30.9 DERMATITIS: ICD-10-CM

## 2023-06-08 RX ORDER — TRIAMCINOLONE ACETONIDE 1 MG/G
OINTMENT TOPICAL 2 TIMES DAILY
Qty: 30 G | Refills: 1 | Status: SHIPPED | OUTPATIENT
Start: 2023-06-08 | End: 2023-07-04 | Stop reason: CLARIF

## 2023-06-08 NOTE — PROGRESS NOTES
Clinic note     Patient name: Tavia Jordan is a 63 y.o. female   Chief compliant   Chief Complaint   Patient presents with    Rash     Lower legs, some on back and right arm. Doesn't itch has bad worse when she gets stressed. Has never seen a derm in the past. No changes.       Subjective     History of present illness   In clinic for evaluation of ongoing skin rash to arms and legs, treated with triamcinolone which has helped slightly, discussed referral to dermatology, she agrees with referral   Blood pressure was elevated at last clinic visit, she has a home blood pressure log with her today, will be scanned into records   Will obtain routine lab and start lisinopril 10 mg daily         Rash  Pertinent negatives include no cough, diarrhea, fatigue, fever, shortness of breath or vomiting.     Social History     Tobacco Use    Smoking status: Never     Passive exposure: Never    Smokeless tobacco: Never   Substance Use Topics    Alcohol use: Never    Drug use: Never       Review of patient's allergies indicates:  No Known Allergies    History reviewed. No pertinent past medical history.    Past Surgical History:   Procedure Laterality Date    HYSTERECTOMY  1980        History reviewed. No pertinent family history.      Current Outpatient Medications:     atorvastatin (LIPITOR) 10 MG tablet, Take 1 tablet (10 mg total) by mouth once daily., Disp: 90 tablet, Rfl: 0    lisinopriL 10 MG tablet, Take 1 tablet (10 mg total) by mouth once daily., Disp: 30 tablet, Rfl: 2    triamcinolone acetonide 0.1% (KENALOG) 0.1 % ointment, Apply topically 2 (two) times daily. Apply to affected areas as needed, Disp: 30 g, Rfl: 1    vitamins A,C,E-zinc-copper 4,296 mcg-226 mg-90 mg Cap, Take by mouth., Disp: , Rfl:     Review of Systems   Constitutional:  Negative for appetite change, chills, fatigue, fever and unexpected weight change.   Respiratory:  Negative for cough and shortness of breath.    Cardiovascular:  Negative  "for chest pain, palpitations and leg swelling.   Gastrointestinal:  Negative for abdominal pain, change in bowel habit, constipation, diarrhea, nausea, vomiting and change in bowel habit.   Genitourinary:  Negative for dysuria and frequency.   Musculoskeletal:  Negative for arthralgias, gait problem and myalgias.   Integumentary:  Positive for rash.   Neurological:  Negative for dizziness, syncope, light-headedness and headaches.   Psychiatric/Behavioral:  Negative for dysphoric mood and sleep disturbance. The patient is not nervous/anxious.      Objective     BP (!) 157/90   Ht 5' 3" (1.6 m)   Wt 86.2 kg (190 lb)   BMI 33.66 kg/m²     Physical Exam   Constitutional: She is oriented to person, place, and time. No distress.   HENT:   Head: Atraumatic.   Mouth/Throat: Mucous membranes are moist.   Cardiovascular: Normal rate and regular rhythm. Pulmonary:      Effort: Pulmonary effort is normal. No respiratory distress.      Breath sounds: Normal breath sounds. No wheezing, rhonchi or rales.     Abdominal: Soft. Bowel sounds are normal. She exhibits no distension. There is no abdominal tenderness.   Musculoskeletal:         General: Normal range of motion.      Cervical back: Neck supple.      Right lower leg: No edema.      Left lower leg: No edema.   Neurological: She is alert and oriented to person, place, and time. Gait normal.   Skin: Skin is warm and dry.   Scattered patches of papular rash to BLE    Psychiatric: Her behavior is normal. Mood normal.     Lab Results   Component Value Date    WBC 10.03 05/18/2023    HGB 12.9 05/18/2023    HCT 40.8 05/18/2023    MCV 89.9 05/18/2023     05/18/2023       CMP  Sodium   Date Value Ref Range Status   05/18/2023 135 (L) 136 - 145 mmol/L Final     Potassium   Date Value Ref Range Status   05/18/2023 4.6 3.5 - 5.1 mmol/L Final     Chloride   Date Value Ref Range Status   05/18/2023 107 98 - 107 mmol/L Final     CO2   Date Value Ref Range Status   05/18/2023 24 21 " - 32 mmol/L Final     Glucose   Date Value Ref Range Status   05/18/2023 85 74 - 106 mg/dL Final     BUN   Date Value Ref Range Status   05/18/2023 12 7 - 18 mg/dL Final     Creatinine   Date Value Ref Range Status   05/18/2023 0.75 0.55 - 1.02 mg/dL Final     Calcium   Date Value Ref Range Status   05/18/2023 9.3 8.5 - 10.1 mg/dL Final     Total Protein   Date Value Ref Range Status   05/18/2023 8.0 6.4 - 8.2 g/dL Final     Albumin   Date Value Ref Range Status   05/18/2023 3.9 3.5 - 5.0 g/dL Final     Bilirubin, Total   Date Value Ref Range Status   05/18/2023 0.4 >0.0 - 1.2 mg/dL Final     Alk Phos   Date Value Ref Range Status   05/18/2023 140 55 - 142 U/L Final     AST   Date Value Ref Range Status   05/18/2023 33 15 - 37 U/L Final     ALT   Date Value Ref Range Status   05/18/2023 31 13 - 56 U/L Final     Anion Gap   Date Value Ref Range Status   05/18/2023 9 7 - 16 mmol/L Final     Lab Results   Component Value Date    TSH 1.170 05/18/2023     Lab Results   Component Value Date    CHOL 272 (H) 05/18/2023     Lab Results   Component Value Date    HDL 43 05/18/2023     Lab Results   Component Value Date    LDLCALC 186 05/18/2023     Lab Results   Component Value Date    TRIG 217 (H) 05/18/2023     Lab Results   Component Value Date    CHOLHDL 6.3 05/18/2023     Lab Results   Component Value Date    HGBA1C 5.7 05/18/2023         Assessment and Plan   Essential hypertension  -     Lipid Panel; Future; Expected date: 05/18/2023  -     CBC Auto Differential; Future; Expected date: 05/18/2023  -     Comprehensive Metabolic Panel; Future; Expected date: 05/18/2023  -     lisinopriL 10 MG tablet; Take 1 tablet (10 mg total) by mouth once daily.  Dispense: 30 tablet; Refill: 2    Screening for hyperlipidemia  -     Lipid Panel; Future; Expected date: 05/18/2023    Screening for diabetes mellitus  -     Hemoglobin A1C; Future; Expected date: 05/18/2023    Screening for hypothyroidism  -     TSH; Future; Expected date:  05/18/2023    Encounter for hepatitis C screening test for low risk patient  -     Hepatitis C Antibody; Future; Expected date: 05/18/2023    BMI 33.0-33.9,adult    Skin rash  -     Ambulatory referral/consult to Dermatology; Future; Expected date: 05/25/2023          Patient Instructions  Patient Instructions   Referral to dermatology     Start lisinopril 10 mg daily  Check blood pressure in the am and again in the afternoon, keep written log and bring in to follow up visit in two weeks     Advised to monitor BP at home and keep log, bring log in to all clinic visits .  Advised on optimal BP readings - SBP < 130 & DBP < 80.   Recommended DASH diet, stay well hydrated with water daily, eliminate or decrease caffeinated and high calorie drinks, increase physical activity, and lose weight if BMI > 25.0.

## 2023-06-28 ENCOUNTER — OFFICE VISIT (OUTPATIENT)
Dept: DERMATOLOGY | Facility: CLINIC | Age: 63
End: 2023-06-28
Payer: COMMERCIAL

## 2023-06-28 DIAGNOSIS — L30.9 DERMATITIS, UNSPECIFIED: ICD-10-CM

## 2023-06-28 PROCEDURE — 3044F PR MOST RECENT HEMOGLOBIN A1C LEVEL <7.0%: ICD-10-PCS | Mod: ,,, | Performed by: DERMATOLOGY

## 2023-06-28 PROCEDURE — 1160F PR REVIEW ALL MEDS BY PRESCRIBER/CLIN PHARMACIST DOCUMENTED: ICD-10-PCS | Mod: ,,, | Performed by: DERMATOLOGY

## 2023-06-28 PROCEDURE — 4010F ACE/ARB THERAPY RXD/TAKEN: CPT | Mod: ,,, | Performed by: DERMATOLOGY

## 2023-06-28 PROCEDURE — 99204 OFFICE O/P NEW MOD 45 MIN: CPT | Mod: ,,, | Performed by: DERMATOLOGY

## 2023-06-28 PROCEDURE — 1160F RVW MEDS BY RX/DR IN RCRD: CPT | Mod: ,,, | Performed by: DERMATOLOGY

## 2023-06-28 PROCEDURE — 4010F PR ACE/ARB THEARPY RXD/TAKEN: ICD-10-PCS | Mod: ,,, | Performed by: DERMATOLOGY

## 2023-06-28 PROCEDURE — 1159F MED LIST DOCD IN RCRD: CPT | Mod: ,,, | Performed by: DERMATOLOGY

## 2023-06-28 PROCEDURE — 99204 PR OFFICE/OUTPT VISIT, NEW, LEVL IV, 45-59 MIN: ICD-10-PCS | Mod: ,,, | Performed by: DERMATOLOGY

## 2023-06-28 PROCEDURE — 3044F HG A1C LEVEL LT 7.0%: CPT | Mod: ,,, | Performed by: DERMATOLOGY

## 2023-06-28 PROCEDURE — 1159F PR MEDICATION LIST DOCUMENTED IN MEDICAL RECORD: ICD-10-PCS | Mod: ,,, | Performed by: DERMATOLOGY

## 2023-06-28 RX ORDER — CLOBETASOL PROPIONATE 0.5 MG/G
CREAM TOPICAL
Qty: 60 G | Refills: 2 | Status: SHIPPED | OUTPATIENT
Start: 2023-06-28

## 2023-06-28 NOTE — PROGRESS NOTES
[facility]   Sandra Paulino MD    Patient Name: Tavia Jordan  Patient YOB: 1960   Date of Service: 6/28/23    CC: Rash    HPI: Tavia Jordan is a 63 y.o. female here today for rash, located on the bilateral legs.  Rash has been present for 3 months.  Previous treatments include triamcinolone, medrol dosepack, and hydroxyzine.      No past medical history on file.  Past Surgical History:   Procedure Laterality Date    HYSTERECTOMY  1980     Review of patient's allergies indicates:  No Known Allergies    Current Outpatient Medications:     atorvastatin (LIPITOR) 10 MG tablet, Take 1 tablet (10 mg total) by mouth once daily., Disp: 90 tablet, Rfl: 0    clobetasoL (TEMOVATE) 0.05 % cream, Apply to AA on body BID PRN flares tapering with improvement, Disp: 60 g, Rfl: 2    lisinopriL 10 MG tablet, Take 1 tablet (10 mg total) by mouth once daily., Disp: 30 tablet, Rfl: 2    triamcinolone acetonide 0.1% (KENALOG) 0.1 % ointment, Apply topically 2 (two) times daily. Apply to affected areas as needed, Disp: 30 g, Rfl: 1    vitamins A,C,E-zinc-copper 4,296 mcg-226 mg-90 mg Cap, Take by mouth., Disp: , Rfl:     ROS: A focused review of systems was obtained and negative.     Exam: A focused skin exam was performed. All areas examined were normal except as mentioned in the assessment and plan below.  General Appearance of the patient is well developed and well nourished.  Orientation: alert and oriented x 3.  Mood and affect: pleasant.    Assessment:   The encounter diagnosis was Dermatitis, unspecified.    Plan:   Medications Ordered This Encounter   Medications    clobetasoL (TEMOVATE) 0.05 % cream     Sig: Apply to AA on body BID PRN flares tapering with improvement     Dispense:  60 g     Refill:  2     Dermatitis Unspecified  - eczematous plaques on the bilateral legs  DDx: ACD (fragrance) vs eczema vs med eruption     Plan: Counseling  I counseled the patient regarding the  following:  Skin care: Patient instructed to use gentle skin care including dove unscented soap, CeraVe moisturizing cream, and fragrance free laundry detergent.  Expectations: The patient understands that there is not a definitive diagnosis at this time. Further testing or empiric therapy may be necessary to diagnose and improve the condition.  Contact office if: The patient develops a fever, or rash dramatically worsens despite treatment.    - avoid fragrance   -will start clobetasol cream  -will consider BX if does not clear    Follow up in about 6 weeks (around 8/9/2023) for eczema.    Sandra Paulino MD

## 2023-07-04 ENCOUNTER — HOSPITAL ENCOUNTER (EMERGENCY)
Facility: HOSPITAL | Age: 63
Discharge: HOME | End: 2023-07-04
Payer: COMMERCIAL

## 2023-07-04 VITALS
BODY MASS INDEX: 32.96 KG/M2 | HEIGHT: 63 IN | RESPIRATION RATE: 20 BRPM | OXYGEN SATURATION: 97 % | SYSTOLIC BLOOD PRESSURE: 142 MMHG | TEMPERATURE: 98 F | WEIGHT: 186 LBS | HEART RATE: 105 BPM | DIASTOLIC BLOOD PRESSURE: 93 MMHG

## 2023-07-04 DIAGNOSIS — M25.562 ACUTE PAIN OF LEFT KNEE: Primary | ICD-10-CM

## 2023-07-04 DIAGNOSIS — M25.562 KNEE PAIN, LEFT: ICD-10-CM

## 2023-07-04 PROCEDURE — 99284 EMERGENCY DEPT VISIT MOD MDM: CPT

## 2023-07-04 PROCEDURE — 99284 PR EMERGENCY DEPT VISIT,LEVEL IV: ICD-10-PCS | Mod: ,,, | Performed by: NURSE PRACTITIONER

## 2023-07-04 PROCEDURE — 63600175 PHARM REV CODE 636 W HCPCS: Performed by: NURSE PRACTITIONER

## 2023-07-04 PROCEDURE — 99284 EMERGENCY DEPT VISIT MOD MDM: CPT | Mod: ,,, | Performed by: NURSE PRACTITIONER

## 2023-07-04 PROCEDURE — 96372 THER/PROPH/DIAG INJ SC/IM: CPT | Performed by: NURSE PRACTITIONER

## 2023-07-04 RX ORDER — KETOROLAC TROMETHAMINE 30 MG/ML
30 INJECTION, SOLUTION INTRAMUSCULAR; INTRAVENOUS
Status: COMPLETED | OUTPATIENT
Start: 2023-07-04 | End: 2023-07-04

## 2023-07-04 RX ORDER — KETOROLAC TROMETHAMINE 10 MG/1
10 TABLET, FILM COATED ORAL EVERY 6 HOURS
Qty: 20 TABLET | Refills: 0 | Status: SHIPPED | OUTPATIENT
Start: 2023-07-04 | End: 2023-07-09

## 2023-07-04 RX ADMIN — KETOROLAC TROMETHAMINE 30 MG: 30 INJECTION, SOLUTION INTRAMUSCULAR; INTRAVENOUS at 03:07

## 2023-07-04 NOTE — Clinical Note
"Tavia Fernandeztaryn Jordan was seen and treated in our emergency department on 7/4/2023.  She may return to work on 07/06/2023.       If you have any questions or concerns, please don't hesitate to call.      Kian Esquivel NP"

## 2023-07-04 NOTE — ED PROVIDER NOTES
"Encounter Date: 7/4/2023       History     Chief Complaint   Patient presents with    Knee Pain     Left     Presents to ED with c/o left knee pain w/p fall at home---says she tripped over "puppy gate" and fell onto ceramic floor tile. No obvious deformity. + lower knee tenderness.     The history is provided by the patient.   Review of patient's allergies indicates:  No Known Allergies  No past medical history on file.  Past Surgical History:   Procedure Laterality Date    HYSTERECTOMY  1980     No family history on file.  Social History     Tobacco Use    Smoking status: Never     Passive exposure: Never    Smokeless tobacco: Never   Substance Use Topics    Alcohol use: Never    Drug use: Never     Review of Systems   Musculoskeletal:  Positive for arthralgias.   All other systems reviewed and are negative.    Physical Exam     Initial Vitals [07/04/23 1447]   BP Pulse Resp Temp SpO2   (!) 142/93 105 20 97.8 °F (36.6 °C) 97 %      MAP       --         Physical Exam    Nursing note and vitals reviewed.  Constitutional: She appears well-developed and well-nourished.   HENT:   Head: Normocephalic and atraumatic.   Nose: Nose normal.   Mouth/Throat: Oropharynx is clear and moist.   Eyes: Conjunctivae and EOM are normal. Pupils are equal, round, and reactive to light.   Neck: Neck supple.   Normal range of motion.  Cardiovascular:  Normal rate, regular rhythm, normal heart sounds and intact distal pulses.           No murmur heard.  Pulmonary/Chest: Breath sounds normal.   Abdominal: Abdomen is soft. Bowel sounds are normal.   Musculoskeletal:         General: Tenderness present. No edema.      Cervical back: Normal range of motion and neck supple.      Comments: Left Sub-patellar point tenderness, worse w/ movement and palpation. + palpable crepitus w/ ROM     Neurological: She is alert and oriented to person, place, and time. She has normal strength. GCS score is 15. GCS eye subscore is 4. GCS verbal subscore " is 5. GCS motor subscore is 6.   Skin: Skin is warm and dry. Capillary refill takes less than 2 seconds.   Psychiatric: She has a normal mood and affect. Her behavior is normal. Judgment and thought content normal.       Medical Screening Exam   See Full Note    ED Course   Procedures  Labs Reviewed - No data to display       Imaging Results              X-Ray Knee 3 View Left (In process)                      Medications   ketorolac injection 30 mg (30 mg Intramuscular Given 7/4/23 8505)     Medical Decision Making:   ED Management:  Reviewed XR findings w/ patient. Toradol Injection provided. Will treat w/ ICE and NSAIDS and have her f/u w/ pcp in one week as needed.                        Clinical Impression:   Final diagnoses:  [M25.562] Knee pain, left               Kian Esquivel NP  07/04/23 7489

## 2023-07-04 NOTE — DISCHARGE INSTRUCTIONS
How Severe Is Your Skin Lesion?: moderate Take Rx as directed (Ibuprofen 600mg until Rx filled)  Ice Knee for 15 minute intervals as needed  ACE today---you may remove at bedtime  Follow up with pcp in 3-4 days if not improving    Has Your Skin Lesion Been Treated?: not been treated Is This A New Presentation, Or A Follow-Up?: Skin Lesions

## 2023-07-04 NOTE — ED TRIAGE NOTES
Patient presented to the ER c/o left knee pain. Patient states that she was stepping over a baby gate when she tripped and fell onto her left knee.

## 2023-08-07 DIAGNOSIS — I10 ESSENTIAL HYPERTENSION: ICD-10-CM

## 2023-08-07 RX ORDER — LISINOPRIL 10 MG/1
10 TABLET ORAL DAILY
Qty: 30 TABLET | Refills: 2 | Status: SHIPPED | OUTPATIENT
Start: 2023-08-07 | End: 2023-09-07 | Stop reason: SDUPTHER

## 2023-09-06 PROBLEM — E78.5 HYPERLIPIDEMIA: Status: ACTIVE | Noted: 2023-09-06

## 2023-09-06 NOTE — PROGRESS NOTES
Clinic note     Patient name: Tavia Jordan is a 63 y.o. female   Chief compliant   Chief Complaint   Patient presents with    Hypertension    Medication Refill    Hyperlipidemia       Subjective     History of present illness   In clinic for routine follow up and medication refills   Denies any acute concerns   Blood pressure elevated in clinic today, she states home bp readings have been normal, she forgot to bring in her bp log today     Past Medical History: HTN, HLD      Dermatology: Dr Paulino      DXA due declines referral at this time, will consider at a later time and notify clinic when ready to proceed with referral, possible consequences of delaying screening discussed understanding verbalized     Mammogram due  declines referral at this time, will consider at a later time and notify clinic when ready to proceed with referral, possible consequences of delaying screening discussed understanding verbalized     Colonoscopy due declines referral at this time, will consider at a later time and notify clinic when ready to proceed with referral, possible consequences of delaying screening discussed understanding verbalized             Social History     Tobacco Use    Smoking status: Never     Passive exposure: Never    Smokeless tobacco: Never   Substance Use Topics    Alcohol use: Never    Drug use: Never       Review of patient's allergies indicates:  No Known Allergies    Past Medical History:   Diagnosis Date    Essential hypertension 5/18/2023       Past Surgical History:   Procedure Laterality Date    HYSTERECTOMY  1980        History reviewed. No pertinent family history.      Current Outpatient Medications:     atorvastatin (LIPITOR) 10 MG tablet, Take 1 tablet (10 mg total) by mouth once daily., Disp: 90 tablet, Rfl: 0    clobetasoL (TEMOVATE) 0.05 % cream, Apply to AA on body BID PRN flares tapering with improvement, Disp: 60 g, Rfl: 2    lisinopriL 10 MG tablet, Take 1 tablet (10 mg total) by  "mouth once daily., Disp: 30 tablet, Rfl: 2    vitamins A,C,E-zinc-copper 4,296 mcg-226 mg-90 mg Cap, Take by mouth., Disp: , Rfl:     Review of Systems   Constitutional:  Negative for appetite change, chills, fatigue, fever and unexpected weight change.   Respiratory:  Negative for cough and shortness of breath.    Cardiovascular:  Negative for chest pain, palpitations and leg swelling.   Gastrointestinal:  Negative for abdominal pain, change in bowel habit, constipation, diarrhea, nausea, vomiting and change in bowel habit.   Genitourinary:  Negative for dysuria and frequency.   Musculoskeletal:  Negative for arthralgias, gait problem and myalgias.   Neurological:  Negative for dizziness, syncope, light-headedness and headaches.   Psychiatric/Behavioral:  Negative for dysphoric mood and sleep disturbance. The patient is not nervous/anxious.        Objective     BP (!) 168/88   Pulse 71   Resp 13   Ht 5' 3" (1.6 m)   Wt 85.5 kg (188 lb 8 oz)   SpO2 95%   BMI 33.39 kg/m²     Physical Exam   Constitutional: She is oriented to person, place, and time. No distress.   HENT:   Head: Atraumatic.   Mouth/Throat: Mucous membranes are moist.   Cardiovascular: Normal rate and regular rhythm. Pulmonary:      Effort: Pulmonary effort is normal. No respiratory distress.      Breath sounds: Normal breath sounds. No wheezing, rhonchi or rales.     Abdominal: Soft. Bowel sounds are normal. She exhibits no distension. There is no abdominal tenderness.   Musculoskeletal:         General: Normal range of motion.      Cervical back: Neck supple.      Right lower leg: No edema.      Left lower leg: No edema.   Neurological: She is alert and oriented to person, place, and time. Gait normal.   Skin: Skin is warm and dry.   Psychiatric: Her behavior is normal. Mood normal.       Lab Results   Component Value Date    WBC 10.03 05/18/2023    HGB 12.9 05/18/2023    HCT 40.8 05/18/2023    MCV 89.9 05/18/2023     05/18/2023 "       CMP  Sodium   Date Value Ref Range Status   05/18/2023 135 (L) 136 - 145 mmol/L Final     Potassium   Date Value Ref Range Status   05/18/2023 4.6 3.5 - 5.1 mmol/L Final     Chloride   Date Value Ref Range Status   05/18/2023 107 98 - 107 mmol/L Final     CO2   Date Value Ref Range Status   05/18/2023 24 21 - 32 mmol/L Final     Glucose   Date Value Ref Range Status   05/18/2023 85 74 - 106 mg/dL Final     BUN   Date Value Ref Range Status   05/18/2023 12 7 - 18 mg/dL Final     Creatinine   Date Value Ref Range Status   05/18/2023 0.75 0.55 - 1.02 mg/dL Final     Calcium   Date Value Ref Range Status   05/18/2023 9.3 8.5 - 10.1 mg/dL Final     Total Protein   Date Value Ref Range Status   05/18/2023 8.0 6.4 - 8.2 g/dL Final     Albumin   Date Value Ref Range Status   05/18/2023 3.9 3.5 - 5.0 g/dL Final     Bilirubin, Total   Date Value Ref Range Status   05/18/2023 0.4 >0.0 - 1.2 mg/dL Final     Alk Phos   Date Value Ref Range Status   05/18/2023 140 55 - 142 U/L Final     AST   Date Value Ref Range Status   05/18/2023 33 15 - 37 U/L Final     ALT   Date Value Ref Range Status   05/18/2023 31 13 - 56 U/L Final     Anion Gap   Date Value Ref Range Status   05/18/2023 9 7 - 16 mmol/L Final     Lab Results   Component Value Date    TSH 1.170 05/18/2023     Lab Results   Component Value Date    CHOL 272 (H) 05/18/2023     Lab Results   Component Value Date    HDL 43 05/18/2023     Lab Results   Component Value Date    LDLCALC 186 05/18/2023     Lab Results   Component Value Date    TRIG 217 (H) 05/18/2023     Lab Results   Component Value Date    CHOLHDL 6.3 05/18/2023     Lab Results   Component Value Date    HGBA1C 5.7 05/18/2023         Assessment and Plan   Essential hypertension    Hyperlipidemia, unspecified hyperlipidemia type    BMI 33.0-33.9,adult          Patient Instructions  Patient Instructions   Lab obtained in clinic today, we will notify you of results and any necessary changes to plan of care    Refills on routine medications   Follow up on 11/29/2023 and as needed, routine medication refills will be due again on 12/6/2023

## 2023-09-07 ENCOUNTER — OFFICE VISIT (OUTPATIENT)
Dept: FAMILY MEDICINE | Facility: CLINIC | Age: 63
End: 2023-09-07
Payer: COMMERCIAL

## 2023-09-07 VITALS
HEIGHT: 63 IN | BODY MASS INDEX: 33.4 KG/M2 | WEIGHT: 188.5 LBS | SYSTOLIC BLOOD PRESSURE: 168 MMHG | HEART RATE: 71 BPM | OXYGEN SATURATION: 95 % | DIASTOLIC BLOOD PRESSURE: 88 MMHG | RESPIRATION RATE: 13 BRPM

## 2023-09-07 DIAGNOSIS — E78.5 HYPERLIPIDEMIA, UNSPECIFIED HYPERLIPIDEMIA TYPE: ICD-10-CM

## 2023-09-07 DIAGNOSIS — I10 ESSENTIAL HYPERTENSION: Primary | ICD-10-CM

## 2023-09-07 PROBLEM — R03.0 ELEVATED BLOOD PRESSURE READING WITHOUT DIAGNOSIS OF HYPERTENSION: Status: RESOLVED | Noted: 2023-03-28 | Resolved: 2023-09-07

## 2023-09-07 LAB
ALBUMIN SERPL BCP-MCNC: 3.4 G/DL (ref 3.5–5)
ALBUMIN/GLOB SERPL: 0.8 {RATIO}
ALP SERPL-CCNC: 163 U/L (ref 55–142)
ALT SERPL W P-5'-P-CCNC: 21 U/L (ref 13–56)
ANION GAP SERPL CALCULATED.3IONS-SCNC: 11 MMOL/L (ref 7–16)
AST SERPL W P-5'-P-CCNC: 43 U/L (ref 15–37)
BILIRUB SERPL-MCNC: 0.6 MG/DL (ref ?–1.2)
BUN SERPL-MCNC: 11 MG/DL (ref 7–18)
BUN/CREAT SERPL: 17 (ref 6–20)
CALCIUM SERPL-MCNC: 8.7 MG/DL (ref 8.5–10.1)
CHLORIDE SERPL-SCNC: 109 MMOL/L (ref 98–107)
CHOLEST SERPL-MCNC: 166 MG/DL (ref 0–200)
CHOLEST/HDLC SERPL: 3.8 {RATIO}
CO2 SERPL-SCNC: 24 MMOL/L (ref 21–32)
CREAT SERPL-MCNC: 0.64 MG/DL (ref 0.55–1.02)
EGFR (NO RACE VARIABLE) (RUSH/TITUS): 98 ML/MIN/1.73M2
GLOBULIN SER-MCNC: 4.3 G/DL (ref 2–4)
GLUCOSE SERPL-MCNC: 81 MG/DL (ref 74–106)
HDLC SERPL-MCNC: 44 MG/DL (ref 40–60)
LDLC SERPL CALC-MCNC: 98 MG/DL
LDLC/HDLC SERPL: 2.2 {RATIO}
NONHDLC SERPL-MCNC: 122 MG/DL
POTASSIUM SERPL-SCNC: 4.8 MMOL/L (ref 3.5–5.1)
PROT SERPL-MCNC: 7.7 G/DL (ref 6.4–8.2)
SODIUM SERPL-SCNC: 139 MMOL/L (ref 136–145)
TRIGL SERPL-MCNC: 122 MG/DL (ref 35–150)
VLDLC SERPL-MCNC: 24 MG/DL

## 2023-09-07 PROCEDURE — 3077F PR MOST RECENT SYSTOLIC BLOOD PRESSURE >= 140 MM HG: ICD-10-PCS | Mod: ,,, | Performed by: NURSE PRACTITIONER

## 2023-09-07 PROCEDURE — 80053 COMPREHENSIVE METABOLIC PANEL: ICD-10-PCS | Mod: ,,, | Performed by: CLINICAL MEDICAL LABORATORY

## 2023-09-07 PROCEDURE — 1159F PR MEDICATION LIST DOCUMENTED IN MEDICAL RECORD: ICD-10-PCS | Mod: ,,, | Performed by: NURSE PRACTITIONER

## 2023-09-07 PROCEDURE — 99213 PR OFFICE/OUTPT VISIT, EST, LEVL III, 20-29 MIN: ICD-10-PCS | Mod: ,,, | Performed by: NURSE PRACTITIONER

## 2023-09-07 PROCEDURE — 1160F RVW MEDS BY RX/DR IN RCRD: CPT | Mod: ,,, | Performed by: NURSE PRACTITIONER

## 2023-09-07 PROCEDURE — 80061 LIPID PANEL: ICD-10-PCS | Mod: ,,, | Performed by: CLINICAL MEDICAL LABORATORY

## 2023-09-07 PROCEDURE — 1160F PR REVIEW ALL MEDS BY PRESCRIBER/CLIN PHARMACIST DOCUMENTED: ICD-10-PCS | Mod: ,,, | Performed by: NURSE PRACTITIONER

## 2023-09-07 PROCEDURE — 3079F PR MOST RECENT DIASTOLIC BLOOD PRESSURE 80-89 MM HG: ICD-10-PCS | Mod: ,,, | Performed by: NURSE PRACTITIONER

## 2023-09-07 PROCEDURE — 3008F BODY MASS INDEX DOCD: CPT | Mod: ,,, | Performed by: NURSE PRACTITIONER

## 2023-09-07 PROCEDURE — 3008F PR BODY MASS INDEX (BMI) DOCUMENTED: ICD-10-PCS | Mod: ,,, | Performed by: NURSE PRACTITIONER

## 2023-09-07 PROCEDURE — 3044F HG A1C LEVEL LT 7.0%: CPT | Mod: ,,, | Performed by: NURSE PRACTITIONER

## 2023-09-07 PROCEDURE — 80053 COMPREHEN METABOLIC PANEL: CPT | Mod: ,,, | Performed by: CLINICAL MEDICAL LABORATORY

## 2023-09-07 PROCEDURE — 3077F SYST BP >= 140 MM HG: CPT | Mod: ,,, | Performed by: NURSE PRACTITIONER

## 2023-09-07 PROCEDURE — 1159F MED LIST DOCD IN RCRD: CPT | Mod: ,,, | Performed by: NURSE PRACTITIONER

## 2023-09-07 PROCEDURE — 4010F ACE/ARB THERAPY RXD/TAKEN: CPT | Mod: ,,, | Performed by: NURSE PRACTITIONER

## 2023-09-07 PROCEDURE — 3044F PR MOST RECENT HEMOGLOBIN A1C LEVEL <7.0%: ICD-10-PCS | Mod: ,,, | Performed by: NURSE PRACTITIONER

## 2023-09-07 PROCEDURE — 4010F PR ACE/ARB THEARPY RXD/TAKEN: ICD-10-PCS | Mod: ,,, | Performed by: NURSE PRACTITIONER

## 2023-09-07 PROCEDURE — 80061 LIPID PANEL: CPT | Mod: ,,, | Performed by: CLINICAL MEDICAL LABORATORY

## 2023-09-07 PROCEDURE — 99213 OFFICE O/P EST LOW 20 MIN: CPT | Mod: ,,, | Performed by: NURSE PRACTITIONER

## 2023-09-07 PROCEDURE — 3079F DIAST BP 80-89 MM HG: CPT | Mod: ,,, | Performed by: NURSE PRACTITIONER

## 2023-09-07 RX ORDER — ATORVASTATIN CALCIUM 10 MG/1
10 TABLET, FILM COATED ORAL DAILY
Qty: 30 TABLET | Refills: 2 | Status: SHIPPED | OUTPATIENT
Start: 2023-09-07 | End: 2023-11-30 | Stop reason: SDUPTHER

## 2023-09-07 RX ORDER — LISINOPRIL 10 MG/1
10 TABLET ORAL DAILY
Qty: 30 TABLET | Refills: 2 | Status: SHIPPED | OUTPATIENT
Start: 2023-09-07 | End: 2023-11-30 | Stop reason: SDUPTHER

## 2023-09-07 NOTE — PATIENT INSTRUCTIONS
Lab obtained in clinic today, we will notify you of results and any necessary changes to plan of care   Refills on routine medications   Follow up on 11/29/2023 and as needed, routine medication refills will be due again on 12/6/2023

## 2023-09-13 ENCOUNTER — PATIENT OUTREACH (OUTPATIENT)
Dept: ADMINISTRATIVE | Facility: HOSPITAL | Age: 63
End: 2023-09-13

## 2023-09-13 NOTE — LETTER
Dear Ms. Jordan:    Your [facility] primary care team is dedicated to assisting you achieve your health goals.  In order to do so, scheduling your routine screenings and tests are key to your good health.  Our records indicate you may be overdue for your screening mammogram.  Mammography screening can help find breast cancer at an early stage, when it is most likely to be successfully treated.    We encourage you to schedule your appointment at any of our [facility] imaging locations.  To schedule, simply click [email or web address] to get started or visit [email or web address]. If you are unable to schedule via the link, please contact your primary care clinic for scheduling.     If you recently had your mammogram outside of [facility], please notify your primary care team so we can update your health record.       Sincerely,      Your [facility] Primary Care Team

## 2023-09-22 ENCOUNTER — OFFICE VISIT (OUTPATIENT)
Dept: FAMILY MEDICINE | Facility: CLINIC | Age: 63
End: 2023-09-22
Payer: COMMERCIAL

## 2023-09-22 VITALS
BODY MASS INDEX: 33.31 KG/M2 | DIASTOLIC BLOOD PRESSURE: 83 MMHG | WEIGHT: 188 LBS | HEIGHT: 63 IN | SYSTOLIC BLOOD PRESSURE: 142 MMHG | HEART RATE: 83 BPM | TEMPERATURE: 98 F | OXYGEN SATURATION: 97 %

## 2023-09-22 DIAGNOSIS — R09.81 NASAL CONGESTION: ICD-10-CM

## 2023-09-22 DIAGNOSIS — J01.90 ACUTE NON-RECURRENT SINUSITIS, UNSPECIFIED LOCATION: Primary | ICD-10-CM

## 2023-09-22 PROCEDURE — 1101F PR PT FALLS ASSESS DOC 0-1 FALLS W/OUT INJ PAST YR: ICD-10-PCS | Mod: ,,, | Performed by: NURSE PRACTITIONER

## 2023-09-22 PROCEDURE — 96372 PR INJECTION,THERAP/PROPH/DIAG2ST, IM OR SUBCUT: ICD-10-PCS | Mod: ,,, | Performed by: NURSE PRACTITIONER

## 2023-09-22 PROCEDURE — 3079F DIAST BP 80-89 MM HG: CPT | Mod: ,,, | Performed by: NURSE PRACTITIONER

## 2023-09-22 PROCEDURE — 3008F BODY MASS INDEX DOCD: CPT | Mod: ,,, | Performed by: NURSE PRACTITIONER

## 2023-09-22 PROCEDURE — 3079F PR MOST RECENT DIASTOLIC BLOOD PRESSURE 80-89 MM HG: ICD-10-PCS | Mod: ,,, | Performed by: NURSE PRACTITIONER

## 2023-09-22 PROCEDURE — 3288F PR FALLS RISK ASSESSMENT DOCUMENTED: ICD-10-PCS | Mod: ,,, | Performed by: NURSE PRACTITIONER

## 2023-09-22 PROCEDURE — 1160F RVW MEDS BY RX/DR IN RCRD: CPT | Mod: ,,, | Performed by: NURSE PRACTITIONER

## 2023-09-22 PROCEDURE — 4010F ACE/ARB THERAPY RXD/TAKEN: CPT | Mod: ,,, | Performed by: NURSE PRACTITIONER

## 2023-09-22 PROCEDURE — 3044F PR MOST RECENT HEMOGLOBIN A1C LEVEL <7.0%: ICD-10-PCS | Mod: ,,, | Performed by: NURSE PRACTITIONER

## 2023-09-22 PROCEDURE — 1160F PR REVIEW ALL MEDS BY PRESCRIBER/CLIN PHARMACIST DOCUMENTED: ICD-10-PCS | Mod: ,,, | Performed by: NURSE PRACTITIONER

## 2023-09-22 PROCEDURE — 4010F PR ACE/ARB THEARPY RXD/TAKEN: ICD-10-PCS | Mod: ,,, | Performed by: NURSE PRACTITIONER

## 2023-09-22 PROCEDURE — 99213 PR OFFICE/OUTPT VISIT, EST, LEVL III, 20-29 MIN: ICD-10-PCS | Mod: 25,,, | Performed by: NURSE PRACTITIONER

## 2023-09-22 PROCEDURE — 3044F HG A1C LEVEL LT 7.0%: CPT | Mod: ,,, | Performed by: NURSE PRACTITIONER

## 2023-09-22 PROCEDURE — 3008F PR BODY MASS INDEX (BMI) DOCUMENTED: ICD-10-PCS | Mod: ,,, | Performed by: NURSE PRACTITIONER

## 2023-09-22 PROCEDURE — 1159F MED LIST DOCD IN RCRD: CPT | Mod: ,,, | Performed by: NURSE PRACTITIONER

## 2023-09-22 PROCEDURE — 99213 OFFICE O/P EST LOW 20 MIN: CPT | Mod: 25,,, | Performed by: NURSE PRACTITIONER

## 2023-09-22 PROCEDURE — 3077F PR MOST RECENT SYSTOLIC BLOOD PRESSURE >= 140 MM HG: ICD-10-PCS | Mod: ,,, | Performed by: NURSE PRACTITIONER

## 2023-09-22 PROCEDURE — 1101F PT FALLS ASSESS-DOCD LE1/YR: CPT | Mod: ,,, | Performed by: NURSE PRACTITIONER

## 2023-09-22 PROCEDURE — 3288F FALL RISK ASSESSMENT DOCD: CPT | Mod: ,,, | Performed by: NURSE PRACTITIONER

## 2023-09-22 PROCEDURE — 96372 THER/PROPH/DIAG INJ SC/IM: CPT | Mod: ,,, | Performed by: NURSE PRACTITIONER

## 2023-09-22 PROCEDURE — 1159F PR MEDICATION LIST DOCUMENTED IN MEDICAL RECORD: ICD-10-PCS | Mod: ,,, | Performed by: NURSE PRACTITIONER

## 2023-09-22 PROCEDURE — 3077F SYST BP >= 140 MM HG: CPT | Mod: ,,, | Performed by: NURSE PRACTITIONER

## 2023-09-22 RX ORDER — AZITHROMYCIN 250 MG/1
TABLET, FILM COATED ORAL
Qty: 6 TABLET | Refills: 0 | Status: SHIPPED | OUTPATIENT
Start: 2023-09-22 | End: 2023-09-27

## 2023-09-22 RX ORDER — CEFTRIAXONE 1 G/1
1 INJECTION, POWDER, FOR SOLUTION INTRAMUSCULAR; INTRAVENOUS
Status: COMPLETED | OUTPATIENT
Start: 2023-09-22 | End: 2023-09-22

## 2023-09-22 RX ORDER — METHYLPREDNISOLONE ACETATE 40 MG/ML
40 INJECTION, SUSPENSION INTRA-ARTICULAR; INTRALESIONAL; INTRAMUSCULAR; SOFT TISSUE
Status: COMPLETED | OUTPATIENT
Start: 2023-09-22 | End: 2023-09-22

## 2023-09-22 RX ORDER — DEXAMETHASONE SODIUM PHOSPHATE 4 MG/ML
4 INJECTION, SOLUTION INTRA-ARTICULAR; INTRALESIONAL; INTRAMUSCULAR; INTRAVENOUS; SOFT TISSUE
Status: COMPLETED | OUTPATIENT
Start: 2023-09-22 | End: 2023-09-22

## 2023-09-22 RX ADMIN — METHYLPREDNISOLONE ACETATE 40 MG: 40 INJECTION, SUSPENSION INTRA-ARTICULAR; INTRALESIONAL; INTRAMUSCULAR; SOFT TISSUE at 10:09

## 2023-09-22 RX ADMIN — CEFTRIAXONE 1 G: 1 INJECTION, POWDER, FOR SOLUTION INTRAMUSCULAR; INTRAVENOUS at 10:09

## 2023-09-22 RX ADMIN — DEXAMETHASONE SODIUM PHOSPHATE 4 MG: 4 INJECTION, SOLUTION INTRA-ARTICULAR; INTRALESIONAL; INTRAMUSCULAR; INTRAVENOUS; SOFT TISSUE at 10:09

## 2023-09-22 NOTE — PROGRESS NOTES
"Subjective:       Patient ID: Tavia Jordan is a 63 y.o. female.    Chief Complaint: Sinus Problem (Sinus pressure, sneezing, cough. No fever. Started Wednesday afternoon. At home COVID test was negative yesterday. )    HPI    Patient presents c/o sinus pressure, sneezing, cough x 2 days  Home COVID-19 test result was negative yesterday    BP (!) 142/83   Pulse 83   Temp 98.2 °F (36.8 °C)   Ht 5' 3" (1.6 m)   Wt 85.3 kg (188 lb)   SpO2 97%   BMI 33.30 kg/m²     Medication List with Changes/Refills   New Medications    AZITHROMYCIN (Z-TONI) 250 MG TABLET    Take 2 tablets by mouth on day 1; Take 1 tablet by mouth on days 2-5   Current Medications    ATORVASTATIN (LIPITOR) 10 MG TABLET    Take 1 tablet (10 mg total) by mouth once daily.    CLOBETASOL (TEMOVATE) 0.05 % CREAM    Apply to AA on body BID PRN flares tapering with improvement    LISINOPRIL 10 MG TABLET    Take 1 tablet (10 mg total) by mouth once daily.    VITAMINS A,C,E-ZINC-COPPER 4,296 MCG-226 MG-90 MG CAP    Take by mouth.       Review of Systems   Constitutional:  Negative for chills and fever.   HENT:  Positive for nasal congestion, ear pain (bilateral ear pressure), postnasal drip, rhinorrhea, sinus pressure/congestion, sneezing and sore throat. Negative for ear discharge.    Respiratory:  Positive for cough. Negative for shortness of breath and wheezing.    Cardiovascular:  Negative for chest pain.   Gastrointestinal:  Negative for abdominal pain.   Neurological:  Positive for headaches.         Objective:      Physical Exam  Vitals and nursing note reviewed.   Constitutional:       General: She is not in acute distress.     Appearance: Normal appearance.   HENT:      Head: Normocephalic.      Right Ear: Hearing, ear canal and external ear normal. A middle ear effusion is present.      Left Ear: Hearing, ear canal and external ear normal. A middle ear effusion is present.      Nose: Congestion and rhinorrhea present.      Mouth/Throat: "      Mouth: Mucous membranes are moist.      Pharynx: No posterior oropharyngeal erythema.   Eyes:      Conjunctiva/sclera: Conjunctivae normal.   Neck:      Thyroid: No thyromegaly.      Trachea: Trachea normal.   Cardiovascular:      Rate and Rhythm: Normal rate and regular rhythm.      Pulses: Normal pulses.      Heart sounds: Normal heart sounds.   Pulmonary:      Effort: Pulmonary effort is normal. No respiratory distress.      Breath sounds: Normal breath sounds.   Musculoskeletal:      Cervical back: Neck supple.   Skin:     General: Skin is warm and dry.   Neurological:      General: No focal deficit present.      Mental Status: She is alert and oriented to person, place, and time.   Psychiatric:         Mood and Affect: Mood normal.         Behavior: Behavior normal.         Assessment:       1. Acute non-recurrent sinusitis, unspecified location    2. Nasal congestion        Plan:       Patient Instructions   Medication as prescribed along with supportive care measures  IM injections today as ordered    Follow up next week if symptoms persist/worsen  Patient voiced understanding   Acute non-recurrent sinusitis, unspecified location  -     dexAMETHasone injection 4 mg  -     methylPREDNISolone acetate injection 40 mg  -     cefTRIAXone injection 1 g  -     azithromycin (Z-TONI) 250 MG tablet; Take 2 tablets by mouth on day 1; Take 1 tablet by mouth on days 2-5  Dispense: 6 tablet; Refill: 0    Nasal congestion

## 2023-09-22 NOTE — PATIENT INSTRUCTIONS
Medication as prescribed along with supportive care measures  IM injections today as ordered    Follow up next week if symptoms persist/worsen  Patient voiced understanding

## 2023-09-25 ENCOUNTER — PATIENT OUTREACH (OUTPATIENT)
Dept: ADMINISTRATIVE | Facility: HOSPITAL | Age: 63
End: 2023-09-25

## 2023-09-25 NOTE — PROGRESS NOTES
Population Health review of encounter with date of service 9/22/22.  Does not qualify for HY or [facility].  Has HY scheduled for 10/3/2023. [place]

## 2023-10-19 ENCOUNTER — PATIENT MESSAGE (OUTPATIENT)
Dept: ADMINISTRATIVE | Facility: HOSPITAL | Age: 63
End: 2023-10-19

## 2023-11-30 NOTE — PROGRESS NOTES
Clinic note     Patient name: Tavia Jordan is a 63 y.o. female   Chief compliant   Chief Complaint   Patient presents with    Medication Refill     No problems at this time. Here for med refills.  Pt does not want a mammogram, DXA bone scan or c-scope at this time.        Subjective     History of present illness   In clinic for routine follow up and medication refills, Denies any acute concerns at present    Past Medical History: HTN, HLD     Dermatology: Dr Paulino        DXA due declines referral at this time, will consider at a later time and notify clinic when ready to proceed with referral, possible consequences of delaying screening discussed understanding verbalized      Mammogram due  declines referral at this time, will consider at a later time and notify clinic when ready to proceed with referral, possible consequences of delaying screening discussed understanding verbalized      Colonoscopy due declines referral at this time, will consider at a later time and notify clinic when ready to proceed with referral, possible consequences of delaying screening discussed understanding verbalized           Social History     Tobacco Use    Smoking status: Never     Passive exposure: Never    Smokeless tobacco: Never   Substance Use Topics    Alcohol use: Never    Drug use: Never       Review of patient's allergies indicates:  No Known Allergies    Past Medical History:   Diagnosis Date    Essential hypertension 5/18/2023       Past Surgical History:   Procedure Laterality Date    HYSTERECTOMY  1980        History reviewed. No pertinent family history.      Current Outpatient Medications:     clobetasoL (TEMOVATE) 0.05 % cream, Apply to AA on body BID PRN flares tapering with improvement, Disp: 60 g, Rfl: 2    co-enzyme Q-10 30 mg capsule, Take 30 mg by mouth 3 (three) times daily., Disp: , Rfl:     vitamins A,C,E-zinc-copper 4,296 mcg-226 mg-90 mg Cap, Take by mouth., Disp: , Rfl:     atorvastatin (LIPITOR)  "10 MG tablet, Take 1 tablet (10 mg total) by mouth once daily., Disp: 30 tablet, Rfl: 5    lisinopriL 10 MG tablet, Take 1 tablet (10 mg total) by mouth once daily., Disp: 30 tablet, Rfl: 5    Review of Systems   Constitutional:  Negative for appetite change, chills, fatigue, fever and unexpected weight change.   Respiratory:  Negative for cough and shortness of breath.    Cardiovascular:  Negative for chest pain, palpitations and leg swelling.   Gastrointestinal:  Negative for abdominal pain, change in bowel habit, constipation, diarrhea, nausea and vomiting.   Genitourinary:  Negative for dysuria and frequency.   Musculoskeletal:  Negative for arthralgias, gait problem and myalgias.   Neurological:  Negative for dizziness, syncope, light-headedness and headaches.   Psychiatric/Behavioral:  Negative for dysphoric mood and sleep disturbance. The patient is not nervous/anxious.        Objective     BP (!) 155/85   Pulse 66   Ht 5' 3" (1.6 m)   Wt 79 kg (174 lb 1.6 oz)   SpO2 100%   BMI 30.84 kg/m²     Physical Exam   Constitutional: She is oriented to person, place, and time. She appears obese. No distress.   HENT:   Head: Atraumatic.   Mouth/Throat: Mucous membranes are moist.   Cardiovascular: Normal rate and regular rhythm. Pulmonary:      Effort: Pulmonary effort is normal. No respiratory distress.      Breath sounds: Normal breath sounds. No wheezing, rhonchi or rales.     Abdominal: Soft. Bowel sounds are normal. She exhibits no distension. There is no abdominal tenderness.   Musculoskeletal:         General: Normal range of motion.      Cervical back: Neck supple.      Right lower leg: No edema.      Left lower leg: No edema.   Neurological: She is alert and oriented to person, place, and time. Gait normal.   Skin: Skin is warm and dry.   Psychiatric: Her behavior is normal. Mood normal.       Lab Results   Component Value Date    WBC 10.03 05/18/2023    HGB 12.9 05/18/2023    HCT 40.8 05/18/2023    MCV " 89.9 05/18/2023     05/18/2023       CMP  Sodium   Date Value Ref Range Status   09/07/2023 139 136 - 145 mmol/L Final     Potassium   Date Value Ref Range Status   09/07/2023 4.8 3.5 - 5.1 mmol/L Final     Chloride   Date Value Ref Range Status   09/07/2023 109 (H) 98 - 107 mmol/L Final     CO2   Date Value Ref Range Status   09/07/2023 24 21 - 32 mmol/L Final     Glucose   Date Value Ref Range Status   09/07/2023 81 74 - 106 mg/dL Final     BUN   Date Value Ref Range Status   09/07/2023 11 7 - 18 mg/dL Final     Creatinine   Date Value Ref Range Status   09/07/2023 0.64 0.55 - 1.02 mg/dL Final     Calcium   Date Value Ref Range Status   09/07/2023 8.7 8.5 - 10.1 mg/dL Final     Total Protein   Date Value Ref Range Status   09/07/2023 7.7 6.4 - 8.2 g/dL Final     Albumin   Date Value Ref Range Status   09/07/2023 3.4 (L) 3.5 - 5.0 g/dL Final     Bilirubin, Total   Date Value Ref Range Status   09/07/2023 0.6 >0.0 - 1.2 mg/dL Final     Alk Phos   Date Value Ref Range Status   09/07/2023 163 (H) 55 - 142 U/L Final     AST   Date Value Ref Range Status   09/07/2023 43 (H) 15 - 37 U/L Final     ALT   Date Value Ref Range Status   09/07/2023 21 13 - 56 U/L Final     Anion Gap   Date Value Ref Range Status   09/07/2023 11 7 - 16 mmol/L Final     Lab Results   Component Value Date    TSH 1.170 05/18/2023     Lab Results   Component Value Date    CHOL 166 09/07/2023    CHOL 272 (H) 05/18/2023     Lab Results   Component Value Date    HDL 44 09/07/2023    HDL 43 05/18/2023     Lab Results   Component Value Date    LDLCALC 98 09/07/2023    LDLCALC 186 05/18/2023     Lab Results   Component Value Date    TRIG 122 09/07/2023    TRIG 217 (H) 05/18/2023     Lab Results   Component Value Date    CHOLHDL 3.8 09/07/2023    CHOLHDL 6.3 05/18/2023     Lab Results   Component Value Date    HGBA1C 5.7 05/18/2023         Assessment and Plan   Essential hypertension  -     lisinopriL 10 MG tablet; Take 1 tablet (10 mg total) by  mouth once daily.  Dispense: 30 tablet; Refill: 5    Hyperlipidemia, unspecified hyperlipidemia type  -     atorvastatin (LIPITOR) 10 MG tablet; Take 1 tablet (10 mg total) by mouth once daily.  Dispense: 30 tablet; Refill: 5    Class 1 obesity with body mass index (BMI) of 30.0 to 30.9 in adult, unspecified obesity type, unspecified whether serious comorbidity present          Patient Instructions  Patient Instructions   Refills on routine medications   Follow up on 05/27/24 and as needed, routine medication will be due again on 06/01/2024       I have reviewed the encounter and agree with the assessment and plan as put forth by the Nurse Practitioner.    Dr. Márquez

## 2023-12-04 ENCOUNTER — OFFICE VISIT (OUTPATIENT)
Dept: FAMILY MEDICINE | Facility: CLINIC | Age: 63
End: 2023-12-04
Payer: COMMERCIAL

## 2023-12-04 VITALS
WEIGHT: 174.13 LBS | DIASTOLIC BLOOD PRESSURE: 85 MMHG | HEART RATE: 66 BPM | SYSTOLIC BLOOD PRESSURE: 155 MMHG | BODY MASS INDEX: 30.85 KG/M2 | OXYGEN SATURATION: 100 % | HEIGHT: 63 IN

## 2023-12-04 DIAGNOSIS — E66.9 CLASS 1 OBESITY WITH BODY MASS INDEX (BMI) OF 30.0 TO 30.9 IN ADULT, UNSPECIFIED OBESITY TYPE, UNSPECIFIED WHETHER SERIOUS COMORBIDITY PRESENT: ICD-10-CM

## 2023-12-04 DIAGNOSIS — E78.5 HYPERLIPIDEMIA, UNSPECIFIED HYPERLIPIDEMIA TYPE: Chronic | ICD-10-CM

## 2023-12-04 DIAGNOSIS — I10 ESSENTIAL HYPERTENSION: Primary | Chronic | ICD-10-CM

## 2023-12-04 PROBLEM — E66.811 CLASS 1 OBESITY WITH BODY MASS INDEX (BMI) OF 30.0 TO 30.9 IN ADULT: Status: ACTIVE | Noted: 2023-12-04

## 2023-12-04 PROCEDURE — 4010F ACE/ARB THERAPY RXD/TAKEN: CPT | Mod: ,,, | Performed by: NURSE PRACTITIONER

## 2023-12-04 PROCEDURE — 3008F PR BODY MASS INDEX (BMI) DOCUMENTED: ICD-10-PCS | Mod: ,,, | Performed by: NURSE PRACTITIONER

## 2023-12-04 PROCEDURE — 1159F PR MEDICATION LIST DOCUMENTED IN MEDICAL RECORD: ICD-10-PCS | Mod: ,,, | Performed by: NURSE PRACTITIONER

## 2023-12-04 PROCEDURE — 1160F PR REVIEW ALL MEDS BY PRESCRIBER/CLIN PHARMACIST DOCUMENTED: ICD-10-PCS | Mod: ,,, | Performed by: NURSE PRACTITIONER

## 2023-12-04 PROCEDURE — 1159F MED LIST DOCD IN RCRD: CPT | Mod: ,,, | Performed by: NURSE PRACTITIONER

## 2023-12-04 PROCEDURE — 3079F PR MOST RECENT DIASTOLIC BLOOD PRESSURE 80-89 MM HG: ICD-10-PCS | Mod: ,,, | Performed by: NURSE PRACTITIONER

## 2023-12-04 PROCEDURE — 3044F PR MOST RECENT HEMOGLOBIN A1C LEVEL <7.0%: ICD-10-PCS | Mod: ,,, | Performed by: NURSE PRACTITIONER

## 2023-12-04 PROCEDURE — 3077F PR MOST RECENT SYSTOLIC BLOOD PRESSURE >= 140 MM HG: ICD-10-PCS | Mod: ,,, | Performed by: NURSE PRACTITIONER

## 2023-12-04 PROCEDURE — 99214 OFFICE O/P EST MOD 30 MIN: CPT | Mod: ,,, | Performed by: NURSE PRACTITIONER

## 2023-12-04 PROCEDURE — 3288F FALL RISK ASSESSMENT DOCD: CPT | Mod: ,,, | Performed by: NURSE PRACTITIONER

## 2023-12-04 PROCEDURE — 1101F PT FALLS ASSESS-DOCD LE1/YR: CPT | Mod: ,,, | Performed by: NURSE PRACTITIONER

## 2023-12-04 PROCEDURE — 3077F SYST BP >= 140 MM HG: CPT | Mod: ,,, | Performed by: NURSE PRACTITIONER

## 2023-12-04 PROCEDURE — 1160F RVW MEDS BY RX/DR IN RCRD: CPT | Mod: ,,, | Performed by: NURSE PRACTITIONER

## 2023-12-04 PROCEDURE — 1101F PR PT FALLS ASSESS DOC 0-1 FALLS W/OUT INJ PAST YR: ICD-10-PCS | Mod: ,,, | Performed by: NURSE PRACTITIONER

## 2023-12-04 PROCEDURE — 3008F BODY MASS INDEX DOCD: CPT | Mod: ,,, | Performed by: NURSE PRACTITIONER

## 2023-12-04 PROCEDURE — 4010F PR ACE/ARB THEARPY RXD/TAKEN: ICD-10-PCS | Mod: ,,, | Performed by: NURSE PRACTITIONER

## 2023-12-04 PROCEDURE — 3288F PR FALLS RISK ASSESSMENT DOCUMENTED: ICD-10-PCS | Mod: ,,, | Performed by: NURSE PRACTITIONER

## 2023-12-04 PROCEDURE — 3044F HG A1C LEVEL LT 7.0%: CPT | Mod: ,,, | Performed by: NURSE PRACTITIONER

## 2023-12-04 PROCEDURE — 3079F DIAST BP 80-89 MM HG: CPT | Mod: ,,, | Performed by: NURSE PRACTITIONER

## 2023-12-04 PROCEDURE — 99214 PR OFFICE/OUTPT VISIT, EST, LEVL IV, 30-39 MIN: ICD-10-PCS | Mod: ,,, | Performed by: NURSE PRACTITIONER

## 2023-12-04 RX ORDER — LISINOPRIL 10 MG/1
10 TABLET ORAL DAILY
Qty: 30 TABLET | Refills: 5 | Status: SHIPPED | OUTPATIENT
Start: 2023-12-04

## 2023-12-04 RX ORDER — UBIDECARENONE 30 MG
30 CAPSULE ORAL 3 TIMES DAILY
COMMUNITY

## 2023-12-04 RX ORDER — ATORVASTATIN CALCIUM 10 MG/1
10 TABLET, FILM COATED ORAL DAILY
Qty: 30 TABLET | Refills: 5 | Status: SHIPPED | OUTPATIENT
Start: 2023-12-04

## 2023-12-04 NOTE — PATIENT INSTRUCTIONS
Refills on routine medications   Follow up on 05/27/24 and as needed, routine medication will be due again on 06/01/2024

## 2024-04-22 ENCOUNTER — OFFICE VISIT (OUTPATIENT)
Dept: FAMILY MEDICINE | Facility: CLINIC | Age: 64
End: 2024-04-22
Payer: COMMERCIAL

## 2024-04-22 VITALS
HEIGHT: 63 IN | BODY MASS INDEX: 34.2 KG/M2 | RESPIRATION RATE: 13 BRPM | OXYGEN SATURATION: 99 % | HEART RATE: 95 BPM | WEIGHT: 193 LBS | SYSTOLIC BLOOD PRESSURE: 164 MMHG | DIASTOLIC BLOOD PRESSURE: 86 MMHG

## 2024-04-22 DIAGNOSIS — F41.9 ANXIETY: Primary | ICD-10-CM

## 2024-04-22 DIAGNOSIS — F32.A DEPRESSION, UNSPECIFIED DEPRESSION TYPE: ICD-10-CM

## 2024-04-22 DIAGNOSIS — I10 ESSENTIAL HYPERTENSION: ICD-10-CM

## 2024-04-22 PROCEDURE — 4010F ACE/ARB THERAPY RXD/TAKEN: CPT | Mod: ,,, | Performed by: NURSE PRACTITIONER

## 2024-04-22 PROCEDURE — 1160F RVW MEDS BY RX/DR IN RCRD: CPT | Mod: ,,, | Performed by: NURSE PRACTITIONER

## 2024-04-22 PROCEDURE — 3077F SYST BP >= 140 MM HG: CPT | Mod: ,,, | Performed by: NURSE PRACTITIONER

## 2024-04-22 PROCEDURE — 3079F DIAST BP 80-89 MM HG: CPT | Mod: ,,, | Performed by: NURSE PRACTITIONER

## 2024-04-22 PROCEDURE — 1126F AMNT PAIN NOTED NONE PRSNT: CPT | Mod: ,,, | Performed by: NURSE PRACTITIONER

## 2024-04-22 PROCEDURE — 3288F FALL RISK ASSESSMENT DOCD: CPT | Mod: ,,, | Performed by: NURSE PRACTITIONER

## 2024-04-22 PROCEDURE — 99213 OFFICE O/P EST LOW 20 MIN: CPT | Mod: ,,, | Performed by: NURSE PRACTITIONER

## 2024-04-22 PROCEDURE — 1101F PT FALLS ASSESS-DOCD LE1/YR: CPT | Mod: ,,, | Performed by: NURSE PRACTITIONER

## 2024-04-22 PROCEDURE — 3008F BODY MASS INDEX DOCD: CPT | Mod: ,,, | Performed by: NURSE PRACTITIONER

## 2024-04-22 PROCEDURE — 1159F MED LIST DOCD IN RCRD: CPT | Mod: ,,, | Performed by: NURSE PRACTITIONER

## 2024-04-22 RX ORDER — HYDROXYZINE HYDROCHLORIDE 10 MG/1
10 TABLET, FILM COATED ORAL EVERY 8 HOURS PRN
Qty: 60 TABLET | Refills: 1 | Status: SHIPPED | OUTPATIENT
Start: 2024-04-22 | End: 2024-06-17 | Stop reason: SDUPTHER

## 2024-04-22 RX ORDER — ESCITALOPRAM OXALATE 10 MG/1
10 TABLET ORAL DAILY
Qty: 30 TABLET | Refills: 2 | Status: SHIPPED | OUTPATIENT
Start: 2024-04-22 | End: 2024-06-17 | Stop reason: SDUPTHER

## 2024-04-22 NOTE — PATIENT INSTRUCTIONS
Start lexapro 10 mg daily   Atarax 10 mg, take one tablet every 8 hours as needed for anxiety     Follow up in clinic in two weeks     It may take 4 to 6 weeks for medication to work         Consider referral for counseling, call clinic if you decide to proceed and I will place referral to [facility]

## 2024-04-22 NOTE — PROGRESS NOTES
"Clinic note     Patient name: Tavia Jordan is a 64 y.o. female   Chief compliant   Chief Complaint   Patient presents with    Anxiety    Depression     Pt c/o anxiety and depression that has gotten worse in the past year.       Subjective     History of present illness   In clinic for evaluation of depression and anxiety   She reports depressive symptoms and anxiety which has worsened over the last few months, Denies any SI/HI  She states her grand daughter's parents are going through a divorce and she worries about her granddaughter, she does not get to spend as much time with her as she has in the past   She has not taken anything for depression or anxiety in the past, she "just worked through it"   Discussed medication options, will trial lexapro and atarax prn     PHQ9 score  16  JORGE 7 score  19    Past Medical History: HTN, HLD     Dermatology: Dr Paulino     Discussed referral to psychology, she declines referral at this time, she will consider it and notify clinic if she decides to proceed              Social History     Tobacco Use    Smoking status: Never     Passive exposure: Never    Smokeless tobacco: Never   Substance Use Topics    Alcohol use: Never    Drug use: Never       Review of patient's allergies indicates:  No Known Allergies    Past Medical History:   Diagnosis Date    Essential hypertension 5/18/2023       Past Surgical History:   Procedure Laterality Date    HYSTERECTOMY  1980        No family history on file.      Current Outpatient Medications:     atorvastatin (LIPITOR) 10 MG tablet, Take 1 tablet (10 mg total) by mouth once daily., Disp: 30 tablet, Rfl: 5    co-enzyme Q-10 30 mg capsule, Take 30 mg by mouth 3 (three) times daily., Disp: , Rfl:     EScitalopram oxalate (LEXAPRO) 10 MG tablet, Take 1 tablet (10 mg total) by mouth once daily., Disp: 30 tablet, Rfl: 2    hydrOXYzine HCL (ATARAX) 10 MG Tab, Take 1 tablet (10 mg total) by mouth every 8 (eight) hours as needed " "(anxiety)., Disp: 60 tablet, Rfl: 1    lisinopriL 10 MG tablet, Take 1 tablet (10 mg total) by mouth once daily., Disp: 30 tablet, Rfl: 5    vitamins A,C,E-zinc-copper 4,296 mcg-226 mg-90 mg Cap, Take by mouth., Disp: , Rfl:     Review of Systems   Constitutional:  Negative for appetite change, chills, fatigue, fever and unexpected weight change.   Respiratory:  Negative for cough and shortness of breath.    Cardiovascular:  Negative for chest pain, palpitations and leg swelling.   Gastrointestinal:  Negative for abdominal pain, change in bowel habit, constipation, diarrhea, nausea and vomiting.   Genitourinary:  Negative for dysuria and frequency.   Musculoskeletal:  Negative for arthralgias, gait problem and myalgias.   Neurological:  Negative for dizziness, syncope, light-headedness and headaches.   Psychiatric/Behavioral:  Positive for dysphoric mood. Negative for hallucinations, sleep disturbance and suicidal ideas. The patient is nervous/anxious.        Objective     BP (!) 164/86   Pulse 95   Resp 13   Ht 5' 3" (1.6 m)   Wt 87.5 kg (193 lb)   SpO2 99%   BMI 34.19 kg/m²     Physical Exam   Constitutional: She is oriented to person, place, and time. She is cooperative. No distress.   HENT:   Head: Atraumatic.   Mouth/Throat: Mucous membranes are moist.   Cardiovascular: Normal rate and regular rhythm. Pulmonary:      Effort: Pulmonary effort is normal. No respiratory distress.      Breath sounds: Normal breath sounds. No wheezing, rhonchi or rales.     Abdominal: Soft. Bowel sounds are normal. She exhibits no distension. There is no abdominal tenderness.   Musculoskeletal:         General: Normal range of motion.      Cervical back: Neck supple.      Right lower leg: No edema.      Left lower leg: No edema.   Neurological: She is alert and oriented to person, place, and time. Gait normal.   Skin: Skin is warm and dry.   Psychiatric: Her speech is normal and behavior is normal. Thought content normal. " Thought content is not paranoid. She expresses no homicidal and no suicidal ideation. She expresses no suicidal plans and no homicidal plans.       Lab Results   Component Value Date    WBC 10.03 05/18/2023    HGB 12.9 05/18/2023    HCT 40.8 05/18/2023    MCV 89.9 05/18/2023     05/18/2023       CMP  Sodium   Date Value Ref Range Status   09/07/2023 139 136 - 145 mmol/L Final     Potassium   Date Value Ref Range Status   09/07/2023 4.8 3.5 - 5.1 mmol/L Final     Chloride   Date Value Ref Range Status   09/07/2023 109 (H) 98 - 107 mmol/L Final     CO2   Date Value Ref Range Status   09/07/2023 24 21 - 32 mmol/L Final     Glucose   Date Value Ref Range Status   09/07/2023 81 74 - 106 mg/dL Final     BUN   Date Value Ref Range Status   09/07/2023 11 7 - 18 mg/dL Final     Creatinine   Date Value Ref Range Status   09/07/2023 0.64 0.55 - 1.02 mg/dL Final     Calcium   Date Value Ref Range Status   09/07/2023 8.7 8.5 - 10.1 mg/dL Final     Total Protein   Date Value Ref Range Status   09/07/2023 7.7 6.4 - 8.2 g/dL Final     Albumin   Date Value Ref Range Status   09/07/2023 3.4 (L) 3.5 - 5.0 g/dL Final     Bilirubin, Total   Date Value Ref Range Status   09/07/2023 0.6 >0.0 - 1.2 mg/dL Final     Alk Phos   Date Value Ref Range Status   09/07/2023 163 (H) 55 - 142 U/L Final     AST   Date Value Ref Range Status   09/07/2023 43 (H) 15 - 37 U/L Final     ALT   Date Value Ref Range Status   09/07/2023 21 13 - 56 U/L Final     Anion Gap   Date Value Ref Range Status   09/07/2023 11 7 - 16 mmol/L Final     Lab Results   Component Value Date    TSH 1.170 05/18/2023     Lab Results   Component Value Date    CHOL 166 09/07/2023    CHOL 272 (H) 05/18/2023     Lab Results   Component Value Date    HDL 44 09/07/2023    HDL 43 05/18/2023     Lab Results   Component Value Date    LDLCALC 98 09/07/2023    LDLCALC 186 05/18/2023     Lab Results   Component Value Date    TRIG 122 09/07/2023    TRIG 217 (H) 05/18/2023     Lab  Results   Component Value Date    CHOLHDL 3.8 09/07/2023    CHOLHDL 6.3 05/18/2023     Lab Results   Component Value Date    HGBA1C 5.7 05/18/2023         Assessment and Plan   Anxiety  -     EScitalopram oxalate (LEXAPRO) 10 MG tablet; Take 1 tablet (10 mg total) by mouth once daily.  Dispense: 30 tablet; Refill: 2  -     hydrOXYzine HCL (ATARAX) 10 MG Tab; Take 1 tablet (10 mg total) by mouth every 8 (eight) hours as needed (anxiety).  Dispense: 60 tablet; Refill: 1    Depression, unspecified depression type  -     EScitalopram oxalate (LEXAPRO) 10 MG tablet; Take 1 tablet (10 mg total) by mouth once daily.  Dispense: 30 tablet; Refill: 2    Essential hypertension          Patient Instructions  Patient Instructions   Start lexapro 10 mg daily   Atarax 10 mg, take one tablet every 8 hours as needed for anxiety     Follow up in clinic in two weeks     It may take 4 to 6 weeks for medication to work         Consider referral for counseling, call clinic if you decide to proceed and I will place referral to Jose Antonio at [facility]       I have reviewed this encounter and agree with the assessment and plan as put forth by the nurse practitioner.  Dr. Márquez.

## 2024-05-06 ENCOUNTER — OFFICE VISIT (OUTPATIENT)
Dept: FAMILY MEDICINE | Facility: CLINIC | Age: 64
End: 2024-05-06
Payer: COMMERCIAL

## 2024-05-06 VITALS
HEART RATE: 74 BPM | HEIGHT: 63 IN | BODY MASS INDEX: 34.02 KG/M2 | WEIGHT: 192 LBS | SYSTOLIC BLOOD PRESSURE: 128 MMHG | OXYGEN SATURATION: 97 % | DIASTOLIC BLOOD PRESSURE: 82 MMHG | RESPIRATION RATE: 12 BRPM

## 2024-05-06 DIAGNOSIS — I10 ESSENTIAL HYPERTENSION: ICD-10-CM

## 2024-05-06 DIAGNOSIS — F41.9 ANXIETY: Primary | ICD-10-CM

## 2024-05-06 DIAGNOSIS — F32.A DEPRESSION, UNSPECIFIED DEPRESSION TYPE: ICD-10-CM

## 2024-05-06 PROCEDURE — 1159F MED LIST DOCD IN RCRD: CPT | Mod: ,,, | Performed by: NURSE PRACTITIONER

## 2024-05-06 PROCEDURE — 4010F ACE/ARB THERAPY RXD/TAKEN: CPT | Mod: ,,, | Performed by: NURSE PRACTITIONER

## 2024-05-06 PROCEDURE — 3074F SYST BP LT 130 MM HG: CPT | Mod: ,,, | Performed by: NURSE PRACTITIONER

## 2024-05-06 PROCEDURE — 1101F PT FALLS ASSESS-DOCD LE1/YR: CPT | Mod: ,,, | Performed by: NURSE PRACTITIONER

## 2024-05-06 PROCEDURE — 3079F DIAST BP 80-89 MM HG: CPT | Mod: ,,, | Performed by: NURSE PRACTITIONER

## 2024-05-06 PROCEDURE — 1126F AMNT PAIN NOTED NONE PRSNT: CPT | Mod: ,,, | Performed by: NURSE PRACTITIONER

## 2024-05-06 PROCEDURE — 3008F BODY MASS INDEX DOCD: CPT | Mod: ,,, | Performed by: NURSE PRACTITIONER

## 2024-05-06 PROCEDURE — 1160F RVW MEDS BY RX/DR IN RCRD: CPT | Mod: ,,, | Performed by: NURSE PRACTITIONER

## 2024-05-06 PROCEDURE — 3288F FALL RISK ASSESSMENT DOCD: CPT | Mod: ,,, | Performed by: NURSE PRACTITIONER

## 2024-05-06 PROCEDURE — 99212 OFFICE O/P EST SF 10 MIN: CPT | Mod: ,,, | Performed by: NURSE PRACTITIONER

## 2024-05-06 NOTE — PROGRESS NOTES
"Clinic note     Patient name: Tavia Jordan is a 64 y.o. female   Chief compliant   Chief Complaint   Patient presents with    Anxiety     Pt here for 2 week follow up, states she feels so much better       Subjective     History of present illness   Follow up after starting lexapro and atarax at last office visit   States she is tolerating medication without any problems and she is "feeling so much better"   She reports sleeping better, overall feeling of anxiety is greatly reduced; denies any SI/HI    Past Medical History: HTN, depression, anxiety           Social History     Tobacco Use    Smoking status: Never     Passive exposure: Never    Smokeless tobacco: Never   Substance Use Topics    Alcohol use: Never    Drug use: Never       Review of patient's allergies indicates:  No Known Allergies    Past Medical History:   Diagnosis Date    Essential hypertension 5/18/2023       Past Surgical History:   Procedure Laterality Date    HYSTERECTOMY  1980        No family history on file.      Current Outpatient Medications:     atorvastatin (LIPITOR) 10 MG tablet, Take 1 tablet (10 mg total) by mouth once daily., Disp: 30 tablet, Rfl: 5    co-enzyme Q-10 30 mg capsule, Take 30 mg by mouth 3 (three) times daily., Disp: , Rfl:     EScitalopram oxalate (LEXAPRO) 10 MG tablet, Take 1 tablet (10 mg total) by mouth once daily., Disp: 30 tablet, Rfl: 2    hydrOXYzine HCL (ATARAX) 10 MG Tab, Take 1 tablet (10 mg total) by mouth every 8 (eight) hours as needed (anxiety)., Disp: 60 tablet, Rfl: 1    lisinopriL 10 MG tablet, Take 1 tablet (10 mg total) by mouth once daily., Disp: 30 tablet, Rfl: 5    vitamins A,C,E-zinc-copper 4,296 mcg-226 mg-90 mg Cap, Take by mouth., Disp: , Rfl:     Review of Systems   Constitutional:  Negative for appetite change, chills, fatigue, fever and unexpected weight change.   Respiratory:  Negative for cough and shortness of breath.    Cardiovascular:  Negative for chest pain, palpitations " "and leg swelling.   Gastrointestinal:  Negative for abdominal pain, change in bowel habit, constipation, diarrhea, nausea and vomiting.   Genitourinary:  Negative for dysuria and frequency.   Musculoskeletal:  Negative for arthralgias, gait problem and myalgias.   Neurological:  Negative for dizziness, syncope, light-headedness and headaches.   Psychiatric/Behavioral:  Negative for dysphoric mood and sleep disturbance. The patient is not nervous/anxious.        Objective     /82   Pulse 74   Resp 12   Ht 5' 3" (1.6 m)   Wt 87.1 kg (192 lb)   SpO2 97%   BMI 34.01 kg/m²     Physical Exam   Constitutional: She is oriented to person, place, and time. She is cooperative. No distress.   HENT:   Head: Atraumatic.   Mouth/Throat: Mucous membranes are moist.   Eyes: Conjunctivae are normal.   Cardiovascular: Normal rate and regular rhythm. Pulmonary:      Effort: Pulmonary effort is normal. No respiratory distress.      Breath sounds: Normal breath sounds. No wheezing, rhonchi or rales.     Abdominal: Soft. Bowel sounds are normal. She exhibits no distension. There is no abdominal tenderness.   Musculoskeletal:         General: Normal range of motion.      Cervical back: Neck supple.      Right lower leg: No edema.      Left lower leg: No edema.   Neurological: She is alert and oriented to person, place, and time. Gait normal.   Skin: Skin is warm and dry.   Psychiatric: Her speech is normal and behavior is normal. Mood, affect and thought content normal. Thought content is not paranoid. She expresses no homicidal and no suicidal ideation. She expresses no suicidal plans and no homicidal plans.       Lab Results   Component Value Date    WBC 10.03 05/18/2023    HGB 12.9 05/18/2023    HCT 40.8 05/18/2023    MCV 89.9 05/18/2023     05/18/2023       CMP  Sodium   Date Value Ref Range Status   09/07/2023 139 136 - 145 mmol/L Final     Potassium   Date Value Ref Range Status   09/07/2023 4.8 3.5 - 5.1 mmol/L " Final     Chloride   Date Value Ref Range Status   09/07/2023 109 (H) 98 - 107 mmol/L Final     CO2   Date Value Ref Range Status   09/07/2023 24 21 - 32 mmol/L Final     Glucose   Date Value Ref Range Status   09/07/2023 81 74 - 106 mg/dL Final     BUN   Date Value Ref Range Status   09/07/2023 11 7 - 18 mg/dL Final     Creatinine   Date Value Ref Range Status   09/07/2023 0.64 0.55 - 1.02 mg/dL Final     Calcium   Date Value Ref Range Status   09/07/2023 8.7 8.5 - 10.1 mg/dL Final     Total Protein   Date Value Ref Range Status   09/07/2023 7.7 6.4 - 8.2 g/dL Final     Albumin   Date Value Ref Range Status   09/07/2023 3.4 (L) 3.5 - 5.0 g/dL Final     Bilirubin, Total   Date Value Ref Range Status   09/07/2023 0.6 >0.0 - 1.2 mg/dL Final     Alk Phos   Date Value Ref Range Status   09/07/2023 163 (H) 55 - 142 U/L Final     AST   Date Value Ref Range Status   09/07/2023 43 (H) 15 - 37 U/L Final     ALT   Date Value Ref Range Status   09/07/2023 21 13 - 56 U/L Final     Anion Gap   Date Value Ref Range Status   09/07/2023 11 7 - 16 mmol/L Final     Lab Results   Component Value Date    TSH 1.170 05/18/2023     Lab Results   Component Value Date    CHOL 166 09/07/2023    CHOL 272 (H) 05/18/2023     Lab Results   Component Value Date    HDL 44 09/07/2023    HDL 43 05/18/2023     Lab Results   Component Value Date    LDLCALC 98 09/07/2023    LDLCALC 186 05/18/2023     Lab Results   Component Value Date    TRIG 122 09/07/2023    TRIG 217 (H) 05/18/2023     Lab Results   Component Value Date    CHOLHDL 3.8 09/07/2023    CHOLHDL 6.3 05/18/2023     Lab Results   Component Value Date    HGBA1C 5.7 05/18/2023         Assessment and Plan   Anxiety    Depression, unspecified depression type    Essential hypertension          Patient Instructions  Patient Instructions   Continue current medications   Follow up in clinic in six weeks and as needed

## 2024-06-03 DIAGNOSIS — I10 ESSENTIAL HYPERTENSION: Chronic | ICD-10-CM

## 2024-06-03 DIAGNOSIS — E78.5 HYPERLIPIDEMIA, UNSPECIFIED HYPERLIPIDEMIA TYPE: Chronic | ICD-10-CM

## 2024-06-03 RX ORDER — LISINOPRIL 10 MG/1
10 TABLET ORAL DAILY
Qty: 30 TABLET | Refills: 5 | Status: SHIPPED | OUTPATIENT
Start: 2024-06-03 | End: 2024-06-17 | Stop reason: SDUPTHER

## 2024-06-03 RX ORDER — ATORVASTATIN CALCIUM 10 MG/1
10 TABLET, FILM COATED ORAL DAILY
Qty: 30 TABLET | Refills: 5 | Status: SHIPPED | OUTPATIENT
Start: 2024-06-03 | End: 2024-06-17 | Stop reason: SDUPTHER

## 2024-06-17 ENCOUNTER — OFFICE VISIT (OUTPATIENT)
Dept: FAMILY MEDICINE | Facility: CLINIC | Age: 64
End: 2024-06-17
Payer: COMMERCIAL

## 2024-06-17 VITALS
WEIGHT: 190 LBS | RESPIRATION RATE: 13 BRPM | DIASTOLIC BLOOD PRESSURE: 77 MMHG | SYSTOLIC BLOOD PRESSURE: 128 MMHG | HEART RATE: 68 BPM | BODY MASS INDEX: 33.66 KG/M2 | HEIGHT: 63 IN | OXYGEN SATURATION: 97 %

## 2024-06-17 DIAGNOSIS — F32.A DEPRESSION, UNSPECIFIED DEPRESSION TYPE: Chronic | ICD-10-CM

## 2024-06-17 DIAGNOSIS — Z79.899 HIGH RISK MEDICATION USE: ICD-10-CM

## 2024-06-17 DIAGNOSIS — F41.9 ANXIETY: Chronic | ICD-10-CM

## 2024-06-17 DIAGNOSIS — I10 ESSENTIAL HYPERTENSION: Primary | ICD-10-CM

## 2024-06-17 DIAGNOSIS — E78.5 HYPERLIPIDEMIA, UNSPECIFIED HYPERLIPIDEMIA TYPE: Chronic | ICD-10-CM

## 2024-06-17 LAB
25(OH)D3 SERPL-MCNC: 17 NG/ML
ALBUMIN SERPL BCP-MCNC: 3.8 G/DL (ref 3.5–5)
ALBUMIN/GLOB SERPL: 1 {RATIO}
ALP SERPL-CCNC: 168 U/L (ref 55–142)
ALT SERPL W P-5'-P-CCNC: 19 U/L (ref 13–56)
ANION GAP SERPL CALCULATED.3IONS-SCNC: 11 MMOL/L (ref 7–16)
AST SERPL W P-5'-P-CCNC: 19 U/L (ref 15–37)
BASOPHILS # BLD AUTO: 0.07 K/UL (ref 0–0.2)
BASOPHILS NFR BLD AUTO: 0.8 % (ref 0–1)
BILIRUB SERPL-MCNC: 0.4 MG/DL (ref ?–1.2)
BUN SERPL-MCNC: 15 MG/DL (ref 7–18)
BUN/CREAT SERPL: 20 (ref 6–20)
CALCIUM SERPL-MCNC: 9.5 MG/DL (ref 8.5–10.1)
CHLORIDE SERPL-SCNC: 105 MMOL/L (ref 98–107)
CO2 SERPL-SCNC: 23 MMOL/L (ref 21–32)
CREAT SERPL-MCNC: 0.76 MG/DL (ref 0.55–1.02)
DIFFERENTIAL METHOD BLD: ABNORMAL
EGFR (NO RACE VARIABLE) (RUSH/TITUS): 87 ML/MIN/1.73M2
EOSINOPHIL # BLD AUTO: 0.17 K/UL (ref 0–0.5)
EOSINOPHIL NFR BLD AUTO: 2 % (ref 1–4)
ERYTHROCYTE [DISTWIDTH] IN BLOOD BY AUTOMATED COUNT: 12.9 % (ref 11.5–14.5)
GLOBULIN SER-MCNC: 4 G/DL (ref 2–4)
GLUCOSE SERPL-MCNC: 83 MG/DL (ref 74–106)
HCT VFR BLD AUTO: 38.8 % (ref 38–47)
HGB BLD-MCNC: 11.9 G/DL (ref 12–16)
IMM GRANULOCYTES # BLD AUTO: 0.02 K/UL (ref 0–0.04)
IMM GRANULOCYTES NFR BLD: 0.2 % (ref 0–0.4)
LYMPHOCYTES # BLD AUTO: 3.11 K/UL (ref 1–4.8)
LYMPHOCYTES NFR BLD AUTO: 37.2 % (ref 27–41)
MCH RBC QN AUTO: 28.7 PG (ref 27–31)
MCHC RBC AUTO-ENTMCNC: 30.7 G/DL (ref 32–36)
MCV RBC AUTO: 93.7 FL (ref 80–96)
MONOCYTES # BLD AUTO: 0.63 K/UL (ref 0–0.8)
MONOCYTES NFR BLD AUTO: 7.5 % (ref 2–6)
MPC BLD CALC-MCNC: 10.4 FL (ref 9.4–12.4)
NEUTROPHILS # BLD AUTO: 4.37 K/UL (ref 1.8–7.7)
NEUTROPHILS NFR BLD AUTO: 52.3 % (ref 53–65)
NRBC # BLD AUTO: 0 X10E3/UL
NRBC, AUTO (.00): 0 %
PLATELET # BLD AUTO: 278 K/UL (ref 150–400)
POTASSIUM SERPL-SCNC: 4.3 MMOL/L (ref 3.5–5.1)
PROT SERPL-MCNC: 7.8 G/DL (ref 6.4–8.2)
RBC # BLD AUTO: 4.14 M/UL (ref 4.2–5.4)
SODIUM SERPL-SCNC: 135 MMOL/L (ref 136–145)
WBC # BLD AUTO: 8.37 K/UL (ref 4.5–11)

## 2024-06-17 PROCEDURE — 80053 COMPREHEN METABOLIC PANEL: CPT | Mod: ,,, | Performed by: CLINICAL MEDICAL LABORATORY

## 2024-06-17 PROCEDURE — 3078F DIAST BP <80 MM HG: CPT | Mod: ,,, | Performed by: NURSE PRACTITIONER

## 2024-06-17 PROCEDURE — 1101F PT FALLS ASSESS-DOCD LE1/YR: CPT | Mod: ,,, | Performed by: NURSE PRACTITIONER

## 2024-06-17 PROCEDURE — 99214 OFFICE O/P EST MOD 30 MIN: CPT | Mod: ,,, | Performed by: NURSE PRACTITIONER

## 2024-06-17 PROCEDURE — 4010F ACE/ARB THERAPY RXD/TAKEN: CPT | Mod: ,,, | Performed by: NURSE PRACTITIONER

## 2024-06-17 PROCEDURE — 3074F SYST BP LT 130 MM HG: CPT | Mod: ,,, | Performed by: NURSE PRACTITIONER

## 2024-06-17 PROCEDURE — 82306 VITAMIN D 25 HYDROXY: CPT | Mod: ,,, | Performed by: CLINICAL MEDICAL LABORATORY

## 2024-06-17 PROCEDURE — 3008F BODY MASS INDEX DOCD: CPT | Mod: ,,, | Performed by: NURSE PRACTITIONER

## 2024-06-17 PROCEDURE — 85025 COMPLETE CBC W/AUTO DIFF WBC: CPT | Mod: ,,, | Performed by: CLINICAL MEDICAL LABORATORY

## 2024-06-17 PROCEDURE — 1126F AMNT PAIN NOTED NONE PRSNT: CPT | Mod: ,,, | Performed by: NURSE PRACTITIONER

## 2024-06-17 PROCEDURE — 1160F RVW MEDS BY RX/DR IN RCRD: CPT | Mod: ,,, | Performed by: NURSE PRACTITIONER

## 2024-06-17 PROCEDURE — 1159F MED LIST DOCD IN RCRD: CPT | Mod: ,,, | Performed by: NURSE PRACTITIONER

## 2024-06-17 PROCEDURE — 3288F FALL RISK ASSESSMENT DOCD: CPT | Mod: ,,, | Performed by: NURSE PRACTITIONER

## 2024-06-17 RX ORDER — HYDROXYZINE HYDROCHLORIDE 10 MG/1
10 TABLET, FILM COATED ORAL EVERY 8 HOURS PRN
Qty: 60 TABLET | Refills: 2 | Status: SHIPPED | OUTPATIENT
Start: 2024-06-17

## 2024-06-17 RX ORDER — LISINOPRIL 10 MG/1
10 TABLET ORAL DAILY
Qty: 30 TABLET | Refills: 5 | Status: SHIPPED | OUTPATIENT
Start: 2024-06-17

## 2024-06-17 RX ORDER — ATORVASTATIN CALCIUM 10 MG/1
10 TABLET, FILM COATED ORAL DAILY
Qty: 30 TABLET | Refills: 5 | Status: SHIPPED | OUTPATIENT
Start: 2024-06-17

## 2024-06-17 RX ORDER — ESCITALOPRAM OXALATE 10 MG/1
10 TABLET ORAL DAILY
Qty: 30 TABLET | Refills: 5 | Status: SHIPPED | OUTPATIENT
Start: 2024-06-17

## 2024-06-17 NOTE — PROGRESS NOTES
"Clinic note     Patient name: Tavia Jordan is a 64 y.o. female   Chief compliant   Chief Complaint   Patient presents with    Medication Refill     Pt here for follow up on anxiety, hypertension, hyperlipidemia  Pt refused Tetanus vaccine, mammogram, dexa scan, colorectal cancer screening, shingles vaccine, rsv vaccine, pneumococcal vaccine, and covid vaccine.       Subjective     History of present illness   In clinic for routine follow up and medication refills   She states she is feeling "so much better" after starting lexapro for anxiety and depression, states she is sleeping "great"     Past Medical History: HTN, depression, anxiety     PHQ9 score today 0  (was 16 on 4/22/2024)  JORGE 7 score today 0  (was 19 on 4/22/2024)     Dermatology: Dr Paulino    Mammogram due declines referral at this time, will consider at a later time and notify clinic when ready to proceed with referral, possible consequences of delaying screening discussed understanding verbalized     Colonoscopy due declines referral at this time, will consider at a later time and notify clinic when ready to proceed with referral, possible consequences of delaying screening discussed understanding verbalized     DXA due declines referral at this time, will consider at a later time and notify clinic when ready to proceed with referral, possible consequences of delaying screening discussed understanding verbalized           Social History     Tobacco Use    Smoking status: Never     Passive exposure: Never    Smokeless tobacco: Never   Substance Use Topics    Alcohol use: Never    Drug use: Never       Review of patient's allergies indicates:  No Known Allergies    Past Medical History:   Diagnosis Date    Essential hypertension 5/18/2023       Past Surgical History:   Procedure Laterality Date    HYSTERECTOMY  1980        No family history on file.      Current Outpatient Medications:     atorvastatin (LIPITOR) 10 MG tablet, Take 1 tablet (10 " "mg total) by mouth once daily., Disp: 30 tablet, Rfl: 5    co-enzyme Q-10 30 mg capsule, Take 30 mg by mouth 3 (three) times daily., Disp: , Rfl:     EScitalopram oxalate (LEXAPRO) 10 MG tablet, Take 1 tablet (10 mg total) by mouth once daily., Disp: 30 tablet, Rfl: 5    hydrOXYzine HCL (ATARAX) 10 MG Tab, Take 1 tablet (10 mg total) by mouth every 8 (eight) hours as needed (anxiety)., Disp: 60 tablet, Rfl: 2    lisinopriL 10 MG tablet, Take 1 tablet (10 mg total) by mouth once daily., Disp: 30 tablet, Rfl: 5    vitamins A,C,E-zinc-copper 4,296 mcg-226 mg-90 mg Cap, Take by mouth., Disp: , Rfl:     Review of Systems   Constitutional:  Negative for appetite change, chills, fatigue, fever and unexpected weight change.   Respiratory:  Negative for cough and shortness of breath.    Cardiovascular:  Negative for chest pain, palpitations and leg swelling.   Gastrointestinal:  Negative for abdominal pain, change in bowel habit, constipation, diarrhea, nausea and vomiting.   Genitourinary:  Negative for dysuria and frequency.   Musculoskeletal:  Negative for arthralgias, gait problem and myalgias.   Neurological:  Negative for dizziness, syncope, light-headedness and headaches.   Psychiatric/Behavioral:  Negative for dysphoric mood, hallucinations, sleep disturbance and suicidal ideas. The patient is not nervous/anxious.        Objective     /77   Pulse 68   Resp 13   Ht 5' 3" (1.6 m)   Wt 86.2 kg (190 lb)   SpO2 97%   BMI 33.66 kg/m²     Physical Exam   Constitutional: She is oriented to person, place, and time. She is cooperative. No distress.   HENT:   Head: Atraumatic.   Mouth/Throat: Mucous membranes are moist.   Cardiovascular: Normal rate and regular rhythm. Pulmonary:      Effort: Pulmonary effort is normal. No respiratory distress.      Breath sounds: Normal breath sounds. No wheezing, rhonchi or rales.     Abdominal: Soft. Bowel sounds are normal. She exhibits no distension. There is no abdominal " tenderness.   Musculoskeletal:         General: Normal range of motion.      Cervical back: Neck supple.      Right lower leg: No edema.      Left lower leg: No edema.   Neurological: She is alert and oriented to person, place, and time. Gait normal.   Skin: Skin is warm and dry.   Psychiatric: Her speech is normal and behavior is normal. Mood, affect and thought content normal. Thought content is not paranoid. She expresses no homicidal and no suicidal ideation. She expresses no suicidal plans and no homicidal plans.       Lab Results   Component Value Date    WBC 10.03 05/18/2023    HGB 12.9 05/18/2023    HCT 40.8 05/18/2023    MCV 89.9 05/18/2023     05/18/2023       CMP  Sodium   Date Value Ref Range Status   09/07/2023 139 136 - 145 mmol/L Final     Potassium   Date Value Ref Range Status   09/07/2023 4.8 3.5 - 5.1 mmol/L Final     Chloride   Date Value Ref Range Status   09/07/2023 109 (H) 98 - 107 mmol/L Final     CO2   Date Value Ref Range Status   09/07/2023 24 21 - 32 mmol/L Final     Glucose   Date Value Ref Range Status   09/07/2023 81 74 - 106 mg/dL Final     BUN   Date Value Ref Range Status   09/07/2023 11 7 - 18 mg/dL Final     Creatinine   Date Value Ref Range Status   09/07/2023 0.64 0.55 - 1.02 mg/dL Final     Calcium   Date Value Ref Range Status   09/07/2023 8.7 8.5 - 10.1 mg/dL Final     Total Protein   Date Value Ref Range Status   09/07/2023 7.7 6.4 - 8.2 g/dL Final     Albumin   Date Value Ref Range Status   09/07/2023 3.4 (L) 3.5 - 5.0 g/dL Final     Bilirubin, Total   Date Value Ref Range Status   09/07/2023 0.6 >0.0 - 1.2 mg/dL Final     Alk Phos   Date Value Ref Range Status   09/07/2023 163 (H) 55 - 142 U/L Final     AST   Date Value Ref Range Status   09/07/2023 43 (H) 15 - 37 U/L Final     ALT   Date Value Ref Range Status   09/07/2023 21 13 - 56 U/L Final     Anion Gap   Date Value Ref Range Status   09/07/2023 11 7 - 16 mmol/L Final     Lab Results   Component Value Date     TSH 1.170 05/18/2023     Lab Results   Component Value Date    CHOL 166 09/07/2023    CHOL 272 (H) 05/18/2023     Lab Results   Component Value Date    HDL 44 09/07/2023    HDL 43 05/18/2023     Lab Results   Component Value Date    LDLCALC 98 09/07/2023    LDLCALC 186 05/18/2023     Lab Results   Component Value Date    TRIG 122 09/07/2023    TRIG 217 (H) 05/18/2023     Lab Results   Component Value Date    CHOLHDL 3.8 09/07/2023    CHOLHDL 6.3 05/18/2023     Lab Results   Component Value Date    HGBA1C 5.7 05/18/2023         Assessment and Plan   Essential hypertension  -     Comprehensive Metabolic Panel; Future; Expected date: 06/17/2024  -     CBC Auto Differential; Future; Expected date: 06/17/2024  -     lisinopriL 10 MG tablet; Take 1 tablet (10 mg total) by mouth once daily.  Dispense: 30 tablet; Refill: 5    Anxiety  -     hydrOXYzine HCL (ATARAX) 10 MG Tab; Take 1 tablet (10 mg total) by mouth every 8 (eight) hours as needed (anxiety).  Dispense: 60 tablet; Refill: 2  -     EScitalopram oxalate (LEXAPRO) 10 MG tablet; Take 1 tablet (10 mg total) by mouth once daily.  Dispense: 30 tablet; Refill: 5    Depression, unspecified depression type  -     Vitamin D; Future; Expected date: 06/17/2024  -     EScitalopram oxalate (LEXAPRO) 10 MG tablet; Take 1 tablet (10 mg total) by mouth once daily.  Dispense: 30 tablet; Refill: 5    Hyperlipidemia, unspecified hyperlipidemia type  -     atorvastatin (LIPITOR) 10 MG tablet; Take 1 tablet (10 mg total) by mouth once daily.  Dispense: 30 tablet; Refill: 5    High risk medication use  -     Vitamin D; Future; Expected date: 06/17/2024          Patient Instructions  Patient Instructions   Lab obtained in clinic today, we will notify you of results and any necessary changes to plan of care   Refills on routine medications   Follow up on 12/09/2024  and as needed; routine medication will be due 12/14/2024

## 2024-06-17 NOTE — PATIENT INSTRUCTIONS
Lab obtained in clinic today, we will notify you of results and any necessary changes to plan of care   Refills on routine medications   Follow up on 12/09/2024  and as needed; routine medication will be due 12/14/2024

## 2024-06-18 DIAGNOSIS — E55.9 VITAMIN D DEFICIENCY: Primary | ICD-10-CM

## 2024-06-18 RX ORDER — ERGOCALCIFEROL 1.25 MG/1
50000 CAPSULE ORAL
Qty: 8 CAPSULE | Refills: 0 | Status: SHIPPED | OUTPATIENT
Start: 2024-06-18

## 2024-08-06 ENCOUNTER — TELEPHONE (OUTPATIENT)
Dept: FAMILY MEDICINE | Facility: CLINIC | Age: 64
End: 2024-08-06
Payer: COMMERCIAL

## 2024-08-06 RX ORDER — AZITHROMYCIN 250 MG/1
TABLET, FILM COATED ORAL
Qty: 6 TABLET | Refills: 0 | Status: SHIPPED | OUTPATIENT
Start: 2024-08-06 | End: 2024-08-11

## 2024-12-02 NOTE — PROGRESS NOTES
Clinic note     Patient name: Tavia Jordan is a 64 y.o. female   Chief compliant   Chief Complaint   Patient presents with    Medication Refill     Here for med refills. No problems at this time.        Subjective     History of present illness   In clinic for routine follow up and medication refills   Denies any acute concerns at present   Blood pressure is often elevated in clinic, she monitors at home and reports normal readings     Past Medical History: HTN, depression, anxiety      Dermatology: Dr Paulino     Mammogram due declines referral at this time, will consider at a later time and notify clinic when ready to proceed with referral, possible consequences of delaying screening discussed understanding verbalized      Colonoscopy due declines referral at this time, will consider at a later time and notify clinic when ready to proceed with referral, possible consequences of delaying screening discussed understanding verbalized      DXA due declines referral at this time, will consider at a later time and notify clinic when ready to proceed with referral, possible consequences of delaying screening discussed understanding verbalized              Social History     Tobacco Use    Smoking status: Never     Passive exposure: Never    Smokeless tobacco: Never   Substance Use Topics    Alcohol use: Never    Drug use: Never       Review of patient's allergies indicates:  No Known Allergies    Past Medical History:   Diagnosis Date    Essential hypertension 5/18/2023       Past Surgical History:   Procedure Laterality Date    HYSTERECTOMY  1980        No family history on file.      Current Outpatient Medications:     co-enzyme Q-10 30 mg capsule, Take 30 mg by mouth 3 (three) times daily., Disp: , Rfl:     ergocalciferol (ERGOCALCIFEROL) 50,000 unit Cap, Take 1 capsule (50,000 Units total) by mouth every 7 days., Disp: 8 capsule, Rfl: 0    vitamins A,C,E-zinc-copper 4,296 mcg-226 mg-90 mg Cap, Take by mouth.,  "Disp: , Rfl:     atorvastatin (LIPITOR) 10 MG tablet, Take 1 tablet (10 mg total) by mouth once daily., Disp: 30 tablet, Rfl: 5    EScitalopram oxalate (LEXAPRO) 10 MG tablet, Take 1 tablet (10 mg total) by mouth once daily., Disp: 30 tablet, Rfl: 5    hydrOXYzine HCL (ATARAX) 10 MG Tab, Take 1 tablet (10 mg total) by mouth every 8 (eight) hours as needed (anxiety)., Disp: 60 tablet, Rfl: 2    lisinopriL 10 MG tablet, Take 1 tablet (10 mg total) by mouth once daily., Disp: 30 tablet, Rfl: 5    Review of Systems   Constitutional:  Negative for appetite change, chills, fatigue, fever and unexpected weight change.   Respiratory:  Negative for cough and shortness of breath.    Cardiovascular:  Negative for chest pain, palpitations and leg swelling.   Gastrointestinal:  Negative for abdominal pain, change in bowel habit, constipation, diarrhea, nausea and vomiting.   Genitourinary:  Negative for dysuria and frequency.   Musculoskeletal:  Negative for arthralgias, gait problem and myalgias.   Neurological:  Negative for dizziness, syncope, light-headedness and headaches.   Psychiatric/Behavioral:  Negative for dysphoric mood and sleep disturbance. The patient is not nervous/anxious.        Objective     BP (!) 148/81   Pulse 70   Ht 5' 3" (1.6 m)   Wt 85.6 kg (188 lb 11.2 oz)   SpO2 96%   BMI 33.43 kg/m²     Physical Exam   Constitutional: She is oriented to person, place, and time. She appears obese. No distress.   HENT:   Head: Atraumatic.   Mouth/Throat: Mucous membranes are moist.   Cardiovascular: Normal rate and regular rhythm. Pulmonary:      Effort: Pulmonary effort is normal. No respiratory distress.      Breath sounds: Normal breath sounds. No wheezing, rhonchi or rales.     Abdominal: Soft. Bowel sounds are normal. She exhibits no distension. There is no abdominal tenderness.   Musculoskeletal:         General: Normal range of motion.      Cervical back: Neck supple.      Right lower leg: No edema.      " Left lower leg: No edema.   Neurological: She is alert and oriented to person, place, and time. Gait normal.   Skin: Skin is warm and dry.   Psychiatric: Her behavior is normal. Mood normal.       Lab Results   Component Value Date    WBC 8.37 06/17/2024    HGB 11.9 (L) 06/17/2024    HCT 38.8 06/17/2024    MCV 93.7 06/17/2024     06/17/2024       CMP  Sodium   Date Value Ref Range Status   06/17/2024 135 (L) 136 - 145 mmol/L Final     Potassium   Date Value Ref Range Status   06/17/2024 4.3 3.5 - 5.1 mmol/L Final     Chloride   Date Value Ref Range Status   06/17/2024 105 98 - 107 mmol/L Final     CO2   Date Value Ref Range Status   06/17/2024 23 21 - 32 mmol/L Final     Glucose   Date Value Ref Range Status   06/17/2024 83 74 - 106 mg/dL Final     BUN   Date Value Ref Range Status   06/17/2024 15 7 - 18 mg/dL Final     Creatinine   Date Value Ref Range Status   06/17/2024 0.76 0.55 - 1.02 mg/dL Final     Calcium   Date Value Ref Range Status   06/17/2024 9.5 8.5 - 10.1 mg/dL Final     Total Protein   Date Value Ref Range Status   06/17/2024 7.8 6.4 - 8.2 g/dL Final     Albumin   Date Value Ref Range Status   06/17/2024 3.8 3.5 - 5.0 g/dL Final     Bilirubin, Total   Date Value Ref Range Status   06/17/2024 0.4 >0.0 - 1.2 mg/dL Final     Alk Phos   Date Value Ref Range Status   06/17/2024 168 (H) 55 - 142 U/L Final     AST   Date Value Ref Range Status   06/17/2024 19 15 - 37 U/L Final     ALT   Date Value Ref Range Status   06/17/2024 19 13 - 56 U/L Final     Anion Gap   Date Value Ref Range Status   06/17/2024 11 7 - 16 mmol/L Final     Lab Results   Component Value Date    TSH 1.170 05/18/2023     Lab Results   Component Value Date    CHOL 166 09/07/2023    CHOL 272 (H) 05/18/2023     Lab Results   Component Value Date    HDL 44 09/07/2023    HDL 43 05/18/2023     Lab Results   Component Value Date    LDLCALC 98 09/07/2023    LDLCALC 186 05/18/2023     Lab Results   Component Value Date    TRIG 122  09/07/2023    TRIG 217 (H) 05/18/2023     Lab Results   Component Value Date    CHOLHDL 3.8 09/07/2023    CHOLHDL 6.3 05/18/2023     Lab Results   Component Value Date    HGBA1C 5.7 05/18/2023         Assessment and Plan   Essential hypertension  -     CBC Auto Differential; Future; Expected date: 12/03/2024  -     Comprehensive Metabolic Panel; Future; Expected date: 12/03/2024  -     lisinopriL 10 MG tablet; Take 1 tablet (10 mg total) by mouth once daily.  Dispense: 30 tablet; Refill: 5    Anxiety  -     hydrOXYzine HCL (ATARAX) 10 MG Tab; Take 1 tablet (10 mg total) by mouth every 8 (eight) hours as needed (anxiety).  Dispense: 60 tablet; Refill: 2  -     EScitalopram oxalate (LEXAPRO) 10 MG tablet; Take 1 tablet (10 mg total) by mouth once daily.  Dispense: 30 tablet; Refill: 5    Depression, unspecified depression type  -     EScitalopram oxalate (LEXAPRO) 10 MG tablet; Take 1 tablet (10 mg total) by mouth once daily.  Dispense: 30 tablet; Refill: 5    Hyperlipidemia, unspecified hyperlipidemia type  -     Lipid Panel; Future; Expected date: 12/03/2024  -     atorvastatin (LIPITOR) 10 MG tablet; Take 1 tablet (10 mg total) by mouth once daily.  Dispense: 30 tablet; Refill: 5    Screening for hypothyroidism  -     TSH; Future; Expected date: 12/03/2024    Screening for diabetes mellitus  -     Hemoglobin A1C; Future; Expected date: 12/03/2024    Class 1 obesity due to excess calories with serious comorbidity and body mass index (BMI) of 33.0 to 33.9 in adult          Patient Instructions  Patient Instructions   Lab obtained in clinic today, we will notify you of results and any necessary changes to plan of care   Refills on routine medications   Follow up on 5/27/2025  and as needed; routine medication will be due 5/31/2025

## 2024-12-03 ENCOUNTER — OFFICE VISIT (OUTPATIENT)
Dept: FAMILY MEDICINE | Facility: CLINIC | Age: 64
End: 2024-12-03
Payer: COMMERCIAL

## 2024-12-03 VITALS
OXYGEN SATURATION: 96 % | BODY MASS INDEX: 33.43 KG/M2 | SYSTOLIC BLOOD PRESSURE: 148 MMHG | HEART RATE: 70 BPM | WEIGHT: 188.69 LBS | DIASTOLIC BLOOD PRESSURE: 81 MMHG | HEIGHT: 63 IN

## 2024-12-03 DIAGNOSIS — E78.5 HYPERLIPIDEMIA, UNSPECIFIED HYPERLIPIDEMIA TYPE: Chronic | ICD-10-CM

## 2024-12-03 DIAGNOSIS — E66.811 CLASS 1 OBESITY DUE TO EXCESS CALORIES WITH SERIOUS COMORBIDITY AND BODY MASS INDEX (BMI) OF 33.0 TO 33.9 IN ADULT: Chronic | ICD-10-CM

## 2024-12-03 DIAGNOSIS — I10 ESSENTIAL HYPERTENSION: Primary | ICD-10-CM

## 2024-12-03 DIAGNOSIS — F41.9 ANXIETY: Chronic | ICD-10-CM

## 2024-12-03 DIAGNOSIS — Z13.29 SCREENING FOR HYPOTHYROIDISM: ICD-10-CM

## 2024-12-03 DIAGNOSIS — E66.09 CLASS 1 OBESITY DUE TO EXCESS CALORIES WITH SERIOUS COMORBIDITY AND BODY MASS INDEX (BMI) OF 33.0 TO 33.9 IN ADULT: Chronic | ICD-10-CM

## 2024-12-03 DIAGNOSIS — Z13.1 SCREENING FOR DIABETES MELLITUS: ICD-10-CM

## 2024-12-03 DIAGNOSIS — F32.A DEPRESSION, UNSPECIFIED DEPRESSION TYPE: Chronic | ICD-10-CM

## 2024-12-03 LAB
ALBUMIN SERPL BCP-MCNC: 4 G/DL (ref 3.4–4.8)
ALBUMIN/GLOB SERPL: 1.1 {RATIO}
ALP SERPL-CCNC: 158 U/L (ref 40–150)
ALT SERPL W P-5'-P-CCNC: 12 U/L
ANION GAP SERPL CALCULATED.3IONS-SCNC: 10 MMOL/L (ref 7–16)
AST SERPL W P-5'-P-CCNC: 22 U/L (ref 5–34)
BASOPHILS # BLD AUTO: 0.07 K/UL (ref 0–0.2)
BASOPHILS NFR BLD AUTO: 0.8 % (ref 0–1)
BILIRUB SERPL-MCNC: 0.6 MG/DL
BUN SERPL-MCNC: 11 MG/DL (ref 10–20)
BUN/CREAT SERPL: 14 (ref 6–20)
CALCIUM SERPL-MCNC: 9.2 MG/DL (ref 8.4–10.2)
CHLORIDE SERPL-SCNC: 105 MMOL/L (ref 98–107)
CHOLEST SERPL-MCNC: 185 MG/DL
CHOLEST/HDLC SERPL: 4.4 {RATIO}
CO2 SERPL-SCNC: 28 MMOL/L (ref 23–31)
CREAT SERPL-MCNC: 0.79 MG/DL (ref 0.55–1.02)
DIFFERENTIAL METHOD BLD: ABNORMAL
EGFR (NO RACE VARIABLE) (RUSH/TITUS): 83 ML/MIN/1.73M2
EOSINOPHIL # BLD AUTO: 0.29 K/UL (ref 0–0.5)
EOSINOPHIL NFR BLD AUTO: 3.4 % (ref 1–4)
ERYTHROCYTE [DISTWIDTH] IN BLOOD BY AUTOMATED COUNT: 12.9 % (ref 11.5–14.5)
EST. AVERAGE GLUCOSE BLD GHB EST-MCNC: 120 MG/DL
GLOBULIN SER-MCNC: 3.8 G/DL (ref 2–4)
GLUCOSE SERPL-MCNC: 89 MG/DL (ref 82–115)
HBA1C MFR BLD HPLC: 5.8 %
HCT VFR BLD AUTO: 38.9 % (ref 38–47)
HDLC SERPL-MCNC: 42 MG/DL (ref 35–60)
HGB BLD-MCNC: 12.2 G/DL (ref 12–16)
IMM GRANULOCYTES # BLD AUTO: 0.01 K/UL (ref 0–0.04)
IMM GRANULOCYTES NFR BLD: 0.1 % (ref 0–0.4)
LDLC SERPL CALC-MCNC: 119 MG/DL
LDLC/HDLC SERPL: 2.8 {RATIO}
LYMPHOCYTES # BLD AUTO: 3.15 K/UL (ref 1–4.8)
LYMPHOCYTES NFR BLD AUTO: 36.6 % (ref 27–41)
MCH RBC QN AUTO: 28.8 PG (ref 27–31)
MCHC RBC AUTO-ENTMCNC: 31.4 G/DL (ref 32–36)
MCV RBC AUTO: 91.7 FL (ref 80–96)
MONOCYTES # BLD AUTO: 0.55 K/UL (ref 0–0.8)
MONOCYTES NFR BLD AUTO: 6.4 % (ref 2–6)
MPC BLD CALC-MCNC: 10 FL (ref 9.4–12.4)
NEUTROPHILS # BLD AUTO: 4.53 K/UL (ref 1.8–7.7)
NEUTROPHILS NFR BLD AUTO: 52.7 % (ref 53–65)
NONHDLC SERPL-MCNC: 143 MG/DL
NRBC # BLD AUTO: 0 X10E3/UL
NRBC, AUTO (.00): 0 %
PLATELET # BLD AUTO: 283 K/UL (ref 150–400)
POTASSIUM SERPL-SCNC: 4.1 MMOL/L (ref 3.5–5.1)
PROT SERPL-MCNC: 7.8 G/DL (ref 5.8–7.6)
RBC # BLD AUTO: 4.24 M/UL (ref 4.2–5.4)
SODIUM SERPL-SCNC: 139 MMOL/L (ref 136–145)
TRIGL SERPL-MCNC: 120 MG/DL (ref 37–140)
TSH SERPL DL<=0.005 MIU/L-ACNC: 1.07 UIU/ML (ref 0.35–4.94)
VLDLC SERPL-MCNC: 24 MG/DL
WBC # BLD AUTO: 8.6 K/UL (ref 4.5–11)

## 2024-12-03 PROCEDURE — 80061 LIPID PANEL: CPT | Mod: ,,, | Performed by: CLINICAL MEDICAL LABORATORY

## 2024-12-03 PROCEDURE — 1159F MED LIST DOCD IN RCRD: CPT | Mod: ,,, | Performed by: NURSE PRACTITIONER

## 2024-12-03 PROCEDURE — 4010F ACE/ARB THERAPY RXD/TAKEN: CPT | Mod: ,,, | Performed by: NURSE PRACTITIONER

## 2024-12-03 PROCEDURE — 3077F SYST BP >= 140 MM HG: CPT | Mod: ,,, | Performed by: NURSE PRACTITIONER

## 2024-12-03 PROCEDURE — 3288F FALL RISK ASSESSMENT DOCD: CPT | Mod: ,,, | Performed by: NURSE PRACTITIONER

## 2024-12-03 PROCEDURE — 1126F AMNT PAIN NOTED NONE PRSNT: CPT | Mod: ,,, | Performed by: NURSE PRACTITIONER

## 2024-12-03 PROCEDURE — 83036 HEMOGLOBIN GLYCOSYLATED A1C: CPT | Mod: ,,, | Performed by: CLINICAL MEDICAL LABORATORY

## 2024-12-03 PROCEDURE — 1101F PT FALLS ASSESS-DOCD LE1/YR: CPT | Mod: ,,, | Performed by: NURSE PRACTITIONER

## 2024-12-03 PROCEDURE — 99214 OFFICE O/P EST MOD 30 MIN: CPT | Mod: ,,, | Performed by: NURSE PRACTITIONER

## 2024-12-03 PROCEDURE — 3008F BODY MASS INDEX DOCD: CPT | Mod: ,,, | Performed by: NURSE PRACTITIONER

## 2024-12-03 PROCEDURE — 1160F RVW MEDS BY RX/DR IN RCRD: CPT | Mod: ,,, | Performed by: NURSE PRACTITIONER

## 2024-12-03 PROCEDURE — 3079F DIAST BP 80-89 MM HG: CPT | Mod: ,,, | Performed by: NURSE PRACTITIONER

## 2024-12-03 RX ORDER — ATORVASTATIN CALCIUM 10 MG/1
10 TABLET, FILM COATED ORAL DAILY
Qty: 30 TABLET | Refills: 5 | Status: SHIPPED | OUTPATIENT
Start: 2024-12-03

## 2024-12-03 RX ORDER — ESCITALOPRAM OXALATE 10 MG/1
10 TABLET ORAL DAILY
Qty: 30 TABLET | Refills: 5 | Status: SHIPPED | OUTPATIENT
Start: 2024-12-03

## 2024-12-03 RX ORDER — HYDROXYZINE HYDROCHLORIDE 10 MG/1
10 TABLET, FILM COATED ORAL EVERY 8 HOURS PRN
Qty: 60 TABLET | Refills: 2 | Status: SHIPPED | OUTPATIENT
Start: 2024-12-03

## 2024-12-03 RX ORDER — LISINOPRIL 10 MG/1
10 TABLET ORAL DAILY
Qty: 30 TABLET | Refills: 5 | Status: SHIPPED | OUTPATIENT
Start: 2024-12-03

## 2024-12-03 NOTE — PATIENT INSTRUCTIONS
Lab obtained in clinic today, we will notify you of results and any necessary changes to plan of care   Refills on routine medications   Follow up on 5/27/2025  and as needed; routine medication will be due 5/31/2025

## 2025-05-18 ENCOUNTER — PATIENT MESSAGE (OUTPATIENT)
Dept: FAMILY MEDICINE | Facility: CLINIC | Age: 65
End: 2025-05-18
Payer: COMMERCIAL

## 2025-05-29 ENCOUNTER — PATIENT OUTREACH (OUTPATIENT)
Facility: HOSPITAL | Age: 65
End: 2025-05-29
Payer: COMMERCIAL

## 2025-06-03 ENCOUNTER — OFFICE VISIT (OUTPATIENT)
Dept: FAMILY MEDICINE | Facility: CLINIC | Age: 65
End: 2025-06-03
Payer: COMMERCIAL

## 2025-06-03 VITALS
WEIGHT: 191.5 LBS | DIASTOLIC BLOOD PRESSURE: 76 MMHG | SYSTOLIC BLOOD PRESSURE: 108 MMHG | HEIGHT: 63 IN | HEART RATE: 84 BPM | OXYGEN SATURATION: 98 % | BODY MASS INDEX: 33.93 KG/M2

## 2025-06-03 DIAGNOSIS — E66.09 CLASS 1 OBESITY DUE TO EXCESS CALORIES WITH SERIOUS COMORBIDITY AND BODY MASS INDEX (BMI) OF 33.0 TO 33.9 IN ADULT: Chronic | ICD-10-CM

## 2025-06-03 DIAGNOSIS — F41.9 ANXIETY: Chronic | ICD-10-CM

## 2025-06-03 DIAGNOSIS — Z13.220 SCREENING FOR HYPERLIPIDEMIA: ICD-10-CM

## 2025-06-03 DIAGNOSIS — I10 ESSENTIAL HYPERTENSION: Chronic | ICD-10-CM

## 2025-06-03 DIAGNOSIS — E78.5 HYPERLIPIDEMIA, UNSPECIFIED HYPERLIPIDEMIA TYPE: Chronic | ICD-10-CM

## 2025-06-03 DIAGNOSIS — E66.811 CLASS 1 OBESITY DUE TO EXCESS CALORIES WITH SERIOUS COMORBIDITY AND BODY MASS INDEX (BMI) OF 33.0 TO 33.9 IN ADULT: Chronic | ICD-10-CM

## 2025-06-03 DIAGNOSIS — Z13.1 SCREENING FOR DIABETES MELLITUS: ICD-10-CM

## 2025-06-03 DIAGNOSIS — Z00.00 ROUTINE GENERAL MEDICAL EXAMINATION AT A HEALTH CARE FACILITY: Primary | ICD-10-CM

## 2025-06-03 DIAGNOSIS — F32.A DEPRESSION, UNSPECIFIED DEPRESSION TYPE: Chronic | ICD-10-CM

## 2025-06-03 LAB
CHOLEST SERPL-MCNC: 179 MG/DL
CHOLEST/HDLC SERPL: 5.8 {RATIO}
EST. AVERAGE GLUCOSE BLD GHB EST-MCNC: 117 MG/DL
HBA1C MFR BLD HPLC: 5.7 %
HDLC SERPL-MCNC: 31 MG/DL (ref 35–60)
LDLC SERPL CALC-MCNC: 119 MG/DL
LDLC/HDLC SERPL: 3.8 {RATIO}
NONHDLC SERPL-MCNC: 148 MG/DL
TRIGL SERPL-MCNC: 143 MG/DL (ref 37–140)
VLDLC SERPL-MCNC: 29 MG/DL

## 2025-06-03 PROCEDURE — 99397 PER PM REEVAL EST PAT 65+ YR: CPT | Mod: ,,, | Performed by: NURSE PRACTITIONER

## 2025-06-03 PROCEDURE — 3078F DIAST BP <80 MM HG: CPT | Mod: ,,, | Performed by: NURSE PRACTITIONER

## 2025-06-03 PROCEDURE — 1101F PT FALLS ASSESS-DOCD LE1/YR: CPT | Mod: ,,, | Performed by: NURSE PRACTITIONER

## 2025-06-03 PROCEDURE — 80061 LIPID PANEL: CPT | Mod: ,,, | Performed by: CLINICAL MEDICAL LABORATORY

## 2025-06-03 PROCEDURE — 3288F FALL RISK ASSESSMENT DOCD: CPT | Mod: ,,, | Performed by: NURSE PRACTITIONER

## 2025-06-03 PROCEDURE — 3008F BODY MASS INDEX DOCD: CPT | Mod: ,,, | Performed by: NURSE PRACTITIONER

## 2025-06-03 PROCEDURE — 4010F ACE/ARB THERAPY RXD/TAKEN: CPT | Mod: ,,, | Performed by: NURSE PRACTITIONER

## 2025-06-03 PROCEDURE — 83036 HEMOGLOBIN GLYCOSYLATED A1C: CPT | Mod: ,,, | Performed by: CLINICAL MEDICAL LABORATORY

## 2025-06-03 PROCEDURE — 1160F RVW MEDS BY RX/DR IN RCRD: CPT | Mod: ,,, | Performed by: NURSE PRACTITIONER

## 2025-06-03 PROCEDURE — 1159F MED LIST DOCD IN RCRD: CPT | Mod: ,,, | Performed by: NURSE PRACTITIONER

## 2025-06-03 PROCEDURE — 3074F SYST BP LT 130 MM HG: CPT | Mod: ,,, | Performed by: NURSE PRACTITIONER

## 2025-06-03 PROCEDURE — 1126F AMNT PAIN NOTED NONE PRSNT: CPT | Mod: ,,, | Performed by: NURSE PRACTITIONER

## 2025-06-03 RX ORDER — ESCITALOPRAM OXALATE 10 MG/1
10 TABLET ORAL DAILY
Qty: 30 TABLET | Refills: 5 | Status: SHIPPED | OUTPATIENT
Start: 2025-06-03

## 2025-06-03 RX ORDER — LISINOPRIL 10 MG/1
10 TABLET ORAL DAILY
Qty: 30 TABLET | Refills: 5 | Status: SHIPPED | OUTPATIENT
Start: 2025-06-03

## 2025-06-03 RX ORDER — ATORVASTATIN CALCIUM 10 MG/1
10 TABLET, FILM COATED ORAL DAILY
Qty: 30 TABLET | Refills: 11 | Status: SHIPPED | OUTPATIENT
Start: 2025-06-03

## 2025-06-03 RX ORDER — HYDROXYZINE HYDROCHLORIDE 10 MG/1
10 TABLET, FILM COATED ORAL EVERY 8 HOURS PRN
Qty: 60 TABLET | Refills: 2 | Status: SHIPPED | OUTPATIENT
Start: 2025-06-03

## 2025-06-04 ENCOUNTER — PATIENT OUTREACH (OUTPATIENT)
Facility: HOSPITAL | Age: 65
End: 2025-06-04
Payer: COMMERCIAL

## 2025-06-05 ENCOUNTER — RESULTS FOLLOW-UP (OUTPATIENT)
Dept: FAMILY MEDICINE | Facility: CLINIC | Age: 65
End: 2025-06-05